# Patient Record
Sex: FEMALE | Race: WHITE | NOT HISPANIC OR LATINO | URBAN - METROPOLITAN AREA
[De-identification: names, ages, dates, MRNs, and addresses within clinical notes are randomized per-mention and may not be internally consistent; named-entity substitution may affect disease eponyms.]

---

## 2020-06-01 ENCOUNTER — LAB OUTSIDE AN ENCOUNTER (OUTPATIENT)
Dept: URBAN - METROPOLITAN AREA CLINIC 86 | Facility: CLINIC | Age: 60
End: 2020-06-01

## 2020-06-02 LAB
ALBUMIN: 4.6
ALKALINE PHOSPHATASE: 48
ALT (SGPT): 33
AST (SGOT): 28
BILIRUBIN, DIRECT: 0.09
BILIRUBIN, TOTAL: 0.2
PROTEIN, TOTAL: 7.1

## 2020-09-26 ENCOUNTER — TELEPHONE ENCOUNTER (OUTPATIENT)
Dept: URBAN - METROPOLITAN AREA CLINIC 92 | Facility: CLINIC | Age: 60
End: 2020-09-26

## 2020-09-26 NOTE — HPI-OTHER HISTORIES
The patient is a 58 year old /White female , who presents on referral from Moo Gonzalez MD , after a last visit March 2020 for a follow up telemed evaluation for ZAMORA. Hx a prior liver biopsy on 02/11/2013 showed 10% micro and macrovesicular steatosis, no portal inflammation,no granulamata or malignant infiltrates, no fibrosis.  She states no history of new medications or alcohol use. She denies any tobacco use. The patient reports no personal history of no other habits that could cause liver damage. Per the patient she had a colonoscopy in 2016.      Pt here for TELEMED vost follow up for fatty liver.    March 2020 labs: wbc 8.5 hg 11.6 plat 359 and glu 273 elevated and show local md.  MCH 26 (26.6-33) mchc 30 (31.5-35.7) and should mention to PRIMARY MD. cr 0.94 and bun 19, na 137 k 4.4 and alb 4.2 and tb  less than 0.2 and alk 49 and ast 24 and alt 46. inr 1.0 and hepatitis a immune.   Sugar was high and did in pm. She says she is working with local doctors to get this and has been an issue. A1c is running high and says 8.5 to 7.5 and so trending in the right way.   Prior she had allergic reaction and had steroids in the past and they felt was a triggering event and hard to catch up.  March 16 2020: local [place] scan: mod fatty infilatration of moderately enlarged liver. Nonobstructive 6.4mm  calculus left kidney. Stable appearing 13mm left renal cyst. Pancreas as visualized normal but portions obscured.Spleen normal. No gallstones. cbd normal at 4mm.  Liver 21cm and no liver lesions.   I think sugars issue is not helpign but trending the right way and we need to follow.  Re weight she says was 2 pounds lighter and says has been doing stress eating and working at home.  Sept 2019 u.s: local liver large 20.1cm. Prior 18-19 cm. No gallstones and normal gb wall thickness 2.4mm. cbd 4.7mm. Spleen 9.4cm. Left kidney 6mm stone and 15mm left kidney cyst.   Sept 2019 labs: glu 369 elevated and bun 26 cr 1.04 and na 135 k 5.0 and ast 16 and alt 21. tb 0.4 and alk 51.  inr 0.9 and wbc 8.4 hg 12.6 plat 348. Hep a immune.  Labs went up and we need to follow this.  Last year mentioned prior hemoglobin a1c was 7.7% so it had to get wore and should now getting better help the labs. So we headed right way again.  She has been working with the diabetes educator for the diet.   She seeing local doctor for her kidney issues and cyst.   Asked re the pituitary tumor and has been going to see a neurosx for this-Dr. Brandon Boswell.  Not felt to any impact and oct 2020 to do mri and holding for now.  2/26/19 wbc 9.4 hgb 12.6 plt elevated 386 gluc 167-elevated cr 1.06 alp 55 ast 19 alt 24 inr 1.0  3/19/19 u/s: liver enlarged 18.6cm, down from 19.1cm. consistent with fatty liver. hepatic lesions not seen. patent vessels. spleen measures 8.7cm. atrophic left kidney. nonobstructing left renal calculus  u/s 08/28/2018: liver normal in size and contour and mild diffuse echogenicity, no evidence of focal lesion or intrahepatic biliary dilatation, no gallstones, wall thickeming or pericholecystic fluid, cbd 4mm, spleen not enlarged, no hyrdonephrosis, no ascites  u/s 04/09/2018: liver:borderline hepatomegaly 18.2 cm , mildly hyperechoic parenchyma without focal mass, no obvious perihepatic fluid, normal gallbladder wtihout intraluminal calculi, cbd 3.8mm, spleen normal in size, kidneys normal in size  The key is the diet, the surgars, and tryign to stay on target for the metabolic syndrome.  Duration of visit mins with over 50% of the time explaining pt's condition and treatment plan with the pt and in reviewing the issues.

## 2020-09-28 ENCOUNTER — TELEPHONE ENCOUNTER (OUTPATIENT)
Dept: URBAN - METROPOLITAN AREA CLINIC 92 | Facility: CLINIC | Age: 60
End: 2020-09-28

## 2020-09-28 NOTE — HPI-TODAY'S VISIT:
Dear Mariia,  U.s sent in: mildly enlarged liver. Liver diffusely echogenic and fatty. Spleen normal 9.6cm. No kidney hydronephrosis. No gallstones and common duct 4.5mm. Liver mildly enlarged 22.3 cm.   Prior u.s stated liver moderately enlarged. They prior mentioned 6.4mm kidney stone left kidney but not mentioned now.  Liver 21 cm so measurement could  be a little off on ultrasound.  Please share with local providers.  Dr Stevens

## 2020-10-02 ENCOUNTER — OFFICE VISIT (OUTPATIENT)
Dept: URBAN - METROPOLITAN AREA TELEHEALTH 2 | Facility: TELEHEALTH | Age: 60
End: 2020-10-02
Payer: COMMERCIAL

## 2020-10-02 DIAGNOSIS — R16.0 HEPATOMEGALY: ICD-10-CM

## 2020-10-02 DIAGNOSIS — E66.9 OBESE BODY HABITUS: ICD-10-CM

## 2020-10-02 DIAGNOSIS — K75.81 NASH (NONALCOHOLIC STEATOHEPATITIS): ICD-10-CM

## 2020-10-02 DIAGNOSIS — N28.9 ABNORMAL RENAL FUNCTION: ICD-10-CM

## 2020-10-02 PROCEDURE — G8417 CALC BMI ABV UP PARAM F/U: HCPCS

## 2020-10-02 PROCEDURE — 99214 OFFICE O/P EST MOD 30 MIN: CPT

## 2020-10-02 PROCEDURE — 3017F COLORECTAL CA SCREEN DOC REV: CPT

## 2020-10-02 PROCEDURE — G9903 PT SCRN TBCO ID AS NON USER: HCPCS

## 2020-10-02 PROCEDURE — 1036F TOBACCO NON-USER: CPT

## 2020-10-02 PROCEDURE — G8427 DOCREV CUR MEDS BY ELIG CLIN: HCPCS

## 2020-10-02 RX ORDER — EVENING PRIMROSE OIL 500 MG
AS DIRECTED CAPSULE ORAL
Status: ACTIVE | COMMUNITY

## 2020-10-02 RX ORDER — INSULIN LISPRO 100 [IU]/ML
AS DIRECTED INJECTION, SOLUTION INTRAVENOUS; SUBCUTANEOUS
Status: DISCONTINUED | COMMUNITY

## 2020-10-02 RX ORDER — GLUCOSAMINE/D3/BOSWELLIA SERRA 1500MG-400
1 TABLET TABLET ORAL ONCE A DAY
Status: ACTIVE | COMMUNITY

## 2020-10-02 RX ORDER — ERENUMAB-AOOE 140 MG/ML
AS DIRECTED INJECTION, SOLUTION SUBCUTANEOUS
Status: ACTIVE | COMMUNITY

## 2020-10-02 RX ORDER — LEVOTHYROXINE SODIUM 0.14 MG/1
TAKE 1 TABLET (137 MCG) BY ORAL ROUTE ONCE DAILY TABLET ORAL 1
Qty: 0 | Refills: 0 | Status: ACTIVE | COMMUNITY
Start: 1900-01-01

## 2020-10-02 RX ORDER — METFORMIN HYDROCHLORIDE 1000 MG/1
TAKE 1 TABLET (1,000 MG) BY ORAL ROUTE 2 TIMES PER DAY WITH MORNING AND EVENING MEALS TABLET, COATED ORAL 2
Qty: 0 | Refills: 0 | Status: ACTIVE | COMMUNITY
Start: 1900-01-01

## 2020-10-02 NOTE — HPI-OTHER HISTORIES
The patient is a 60 year old /White female , who presents on referral from Moo Gonzalez MD , after a last visit March 2020 for a follow up telemed evaluation for ZAMORA.   Hx a prior liver biopsy on 02/11/2013 showed 10% micro and macrovesicular steatosis, no portal inflammation,no granulamata or malignant infiltrates, no fibrosis.   She states no history of new medications or alcohol use.   She denies any tobacco use. The patient reports no personal history of no other habits that could cause liver damage.   Per the patient she had a colonoscopy in 2016 and she says that is next is due in 10 yrs.   Pt here for TELEMED vost follow up for fatty liver.    Sept 2020:  U.s sent in: mildly enlarged liver. Liver diffusely echogenic and fatty. Spleen normal 9.6cm. No kidney hydronephrosis. No gallstones and common duct 4.5mm. Liver mildly enlarged 22.3 cm.   Prior u.s stated liver moderately enlarged. They prior mentioned 6.4mm kidney stone left kidney but not mentioned now.  Liver 21 cm so measurement could  be a little off on ultrasound.  she did labs 2 weeks ago [facility]. Not insystem and kathleen will check.  June 2020 ast 28 and alt 33 and alk 48 and tb 0.2. alb 4.1.   March 2020 labs: wbc 8.5 hg 11.6 plat 359 and glu 273 elevated and show local md.  MCH 26 (26.6-33) mchc 30 (31.5-35.7) and should mention to PRIMARY MD. cr 0.94 and bun 19, na 137 k 4.4 and alb 4.2 and tb  less than 0.2 and alk 49 and ast 24 and alt 46. inr 1.0 and hepatitis a immune.   She has not lost weight and she says a1c slightly lower 8.8 from 8.6 and adjusting pump and see if she can get better with new pump.   Prior she had allergic reaction and had steroids in the past and that was issue prior for her but not now and was 3-4 yrs.  March 16 2020: local [place] scan: mod fatty infilatration of moderately enlarged liver. Nonobstructive 6.4mm  calculus left kidney. Stable appearing 13mm left renal cyst. Pancreas as visualized normal but portions obscured.Spleen normal. No gallstones. cbd normal at 4mm.  Liver 21cm and no liver lesions.    She gained 3 pound with pandemic.  Sept 2019 u.s: local liver large 20.1cm. Prior 18-19 cm. No gallstones and normal gb wall thickness 2.4mm. cbd 4.7mm. Spleen 9.4cm. Left kidney 6mm stone and 15mm left kidney cyst.   Sept 2019 labs: glu 369 elevated and bun 26 cr 1.04 and na 135 k 5.0 and ast 16 and alt 21. tb 0.4 and alk 51.  inr 0.9 and wbc 8.4 hg 12.6 plat 348. Hep a immune.  She has been working with the diabetes educator for the diet.   She seeing local doctor for her kidney issues and cyst.   Asked re the pituitary tumor and has been going to see a neurosx for this-Dr. Brandon Boswell and is to follow up with him.    2/26/19 wbc 9.4 hgb 12.6 plt elevated 386 gluc 167-elevated cr 1.06 alp 55 ast 19 alt 24 inr 1.0  3/19/19 u/s: liver enlarged 18.6cm, down from 19.1cm. consistent with fatty liver. hepatic lesions not seen. patent vessels. spleen measures 8.7cm. atrophic left kidney. nonobstructing left renal calculus  u/s 08/28/2018: liver normal in size and contour and mild diffuse echogenicity, no evidence of focal lesion or intrahepatic biliary dilatation, no gallstones, wall thickeming or pericholecystic fluid, cbd 4mm, spleen not enlarged, no hyrdonephrosis, no ascites  u/s 04/09/2018: liver:borderline hepatomegaly 18.2 cm , mildly hyperechoic parenchyma without focal mass, no obvious perihepatic fluid, normal gallbladder wtihout intraluminal calculi, cbd 3.8mm, spleen normal in size, kidneys normal in size  Plan: 1. Work on diet and try to keep this as a loss. 2. She will work with local doctors on new pump. 3. Pt will all and see if we can find the missing labs. 4. Pt will redo labs and imaging in 6m and see us then. 5, Stressed need for the diet, the sugars, some weight loss and staying on target for the metabolic syndrome.  Stressed to pt the need for social distancing and strict handwashing and wearing a mask and to follow any other new or added CDC recommendations as this is an evolving target.  Duration of visit 27 mins with over 50% of the time explaining pt's condition and treatment plan with the pt and in reviewing the issues.

## 2020-10-03 ENCOUNTER — TELEPHONE ENCOUNTER (OUTPATIENT)
Dept: URBAN - METROPOLITAN AREA CLINIC 92 | Facility: CLINIC | Age: 60
End: 2020-10-03

## 2020-10-03 NOTE — HPI-TODAY'S VISIT:
Dear Mariia,  received the aug 3 local labs: po4 3.6 and Uric aid 4.2, ferritin 99, mg 1.8 and a1c 8.6 % elevated. iron sat 19%.  glucose 223 elevated and show local md, cr 0.97 and na 140 and k 5.1 and cl 102 ad co2 22 and ca 9.8 and alb 4,2 and tb 0.2 abd alk 49 and ast 22 and alt 25 and prior ast 28 and alt 33 so lower now. urine protein elevated 408 mg/24 hours ( normal so you have more protein in urine noted.)  Saw  also [facility] labs aug 3 glu 217 and cr 0.99 and na 138 and k 5.2 and cl 100 and co2 22 and ca 9.8 and alb 4.4 and tb 0.2 and alk 48 and ast 18 and alt 23, wbc 8.1 hg 12.5 plat 414.  These were ordered by two diff doctors.  ideal alt <25 and so on one sample right on that and on one little lower.  Defer to local doctors on other labs.  Dr Stevens

## 2021-03-23 ENCOUNTER — TELEPHONE ENCOUNTER (OUTPATIENT)
Dept: URBAN - METROPOLITAN AREA CLINIC 92 | Facility: CLINIC | Age: 61
End: 2021-03-23

## 2021-03-23 LAB
A/G RATIO: 1.9
ALBUMIN: 4.3
ALKALINE PHOSPHATASE: 52
ALT (SGPT): 21
AST (SGOT): 16
BASO (ABSOLUTE): 0
BASOS: 1
BILIRUBIN, TOTAL: 0.3
BUN/CREATININE RATIO: 20
BUN: 20
CALCIUM: 9.9
CARBON DIOXIDE, TOTAL: 23
CHLORIDE: 103
CREATININE: 0.99
EGFR IF AFRICN AM: 72
EGFR IF NONAFRICN AM: 63
EOS (ABSOLUTE): 0.3
EOS: 3
GLOBULIN, TOTAL: 2.3
GLUCOSE: 149
HEMATOCRIT: 40.4
HEMATOLOGY COMMENTS:: (no result)
HEMOGLOBIN: 12.6
IMMATURE CELLS: (no result)
IMMATURE GRANS (ABS): 0
IMMATURE GRANULOCYTES: 1
LYMPHS (ABSOLUTE): 2.4
LYMPHS: 32
MCH: 26.5
MCHC: 31.2
MCV: 85
MONOCYTES(ABSOLUTE): 0.5
MONOCYTES: 7
NEUTROPHILS (ABSOLUTE): 4.3
NEUTROPHILS: 56
NRBC: (no result)
PLATELETS: 370
POTASSIUM: 4.8
PROTEIN, TOTAL: 6.6
RBC: 4.76
RDW: 14
SODIUM: 140
WBC: 7.5

## 2021-03-23 NOTE — HPI-TODAY'S VISIT:
march 22 2021: wbc 7.5 and hg 12.6 and plat 370 and mcv 85. Neutrophils 4.3. tp 6.6 and alb 4.3 and tb 0.3 and alk 52 and ast 16 and alt 21 and ideal alt les than 25. Glu 149 elevated and bun 20 and cr 0.99.March 2020 ast and alt  24 and  26 so lower now.

## 2021-04-01 ENCOUNTER — LAB OUTSIDE AN ENCOUNTER (OUTPATIENT)
Dept: URBAN - METROPOLITAN AREA TELEHEALTH 2 | Facility: TELEHEALTH | Age: 61
End: 2021-04-01

## 2021-04-01 ENCOUNTER — OFFICE VISIT (OUTPATIENT)
Dept: URBAN - METROPOLITAN AREA CLINIC 86 | Facility: CLINIC | Age: 61
End: 2021-04-01

## 2021-04-01 NOTE — HPI-OTHER HISTORIES
The patient is a 60 year old /White female , who presents on referral from Moo Gonzalez MD , after a last visit Oct 2020 for a follow up telemed evaluation for ZAMORA.   Hx a prior liver biopsy on 02/11/2013 showed 10% micro and macrovesicular steatosis, no portal inflammation,no granulamata or malignant infiltrates, no fibrosis.   She states no history of new medications or alcohol use.   She denies any tobacco use. The patient reports no personal history of no other habits that could cause liver damage.   Per the patient she had a colonoscopy in 2016 and she says that is next is due in 10 yrs.   Pt here for TELEMED vost follow up for fatty liver.    march 22 2021: wbc 7.5 and hg 12.6 and plat 370 and mcv 85. Neutrophils 4.3. tp 6.6 and alb 4.3 and tb 0.3 and alk 52 and ast 16 and alt 21 and ideal alt les than 25. Glu 149 elevated and bun 20 and cr 0.99.March 2020 ast and alt  24 and  26 so lower now. Dear Mariia,  received the aug 3 local labs: po4 3.6 and Uric aid 4.2, ferritin 99, mg 1.8 and a1c 8.6 % elevated. iron sat 19%.  glucose 223 elevated and show local md, cr 0.97 and na 140 and k 5.1 and cl 102 ad co2 22 and ca 9.8 and alb 4,2 and tb 0.2 abd alk 49 and ast 22 and alt 25 and prior ast 28 and alt 33 so lower now. urine protein elevated 408 mg/24 hours ( normal so you have more protein in urine noted.)  Saw  also [facility] labs aug 3 glu 217 and cr 0.99 and na 138 and k 5.2 and cl 100 and co2 22 and ca 9.8 and alb 4.4 and tb 0.2 and alk 48 and ast 18 and alt 23, wbc 8.1 hg 12.5 plat 414.  These were ordered by two diff doctors.  ideal alt less than 25 and so on one sample right on that and on one little lower.  Defer to local doctors on other labs.  Dr Stevens   Sept 2020:  U.s sent in: mildly enlarged liver. Liver diffusely echogenic and fatty. Spleen normal 9.6cm. No kidney hydronephrosis. No gallstones and common duct 4.5mm. Liver mildly enlarged 22.3 cm.   Prior u.s stated liver moderately enlarged. They prior mentioned 6.4mm kidney stone left kidney but not mentioned now.  Liver 21 cm so measurement could  be a little off on ultrasound.  she did labs 2 weeks ago [facility]. Not insystem and kathleen will check.  June 2020 ast 28 and alt 33 and alk 48 and tb 0.2. alb 4.1.   March 2020 labs: wbc 8.5 hg 11.6 plat 359 and glu 273 elevated and show local md.  MCH 26 (26.6-33) mchc 30 (31.5-35.7) and should mention to PRIMARY MD. cr 0.94 and bun 19, na 137 k 4.4 and alb 4.2 and tb  less than 0.2 and alk 49 and ast 24 and alt 46. inr 1.0 and hepatitis a immune.   She has not lost weight and she says a1c slightly lower 8.8 from 8.6 and adjusting pump and see if she can get better with new pump.   Prior she had allergic reaction and had steroids in the past and that was issue prior for her but not now and was 3-4 yrs.  March 16 2020: local [place] scan: mod fatty infilatration of moderately enlarged liver. Nonobstructive 6.4mm  calculus left kidney. Stable appearing 13mm left renal cyst. Pancreas as visualized normal but portions obscured.Spleen normal. No gallstones. cbd normal at 4mm.  Liver 21cm and no liver lesions.    She gained 3 pound with pandemic.  Sept 2019 u.s: local liver large 20.1cm. Prior 18-19 cm. No gallstones and normal gb wall thickness 2.4mm. cbd 4.7mm. Spleen 9.4cm. Left kidney 6mm stone and 15mm left kidney cyst.   Sept 2019 labs: glu 369 elevated and bun 26 cr 1.04 and na 135 k 5.0 and ast 16 and alt 21. tb 0.4 and alk 51.  inr 0.9 and wbc 8.4 hg 12.6 plat 348. Hep a immune.  She has been working with the diabetes educator for the diet.   She seeing local doctor for her kidney issues and cyst.   Asked re the pituitary tumor and has been going to see a neurosx for this-Dr. Brandon Boswell and is to follow up with him.    2/26/19 wbc 9.4 hgb 12.6 plt elevated 386 gluc 167-elevated cr 1.06 alp 55 ast 19 alt 24 inr 1.0  3/19/19 u/s: liver enlarged 18.6cm, down from 19.1cm. consistent with fatty liver. hepatic lesions not seen. patent vessels. spleen measures 8.7cm. atrophic left kidney. nonobstructing left renal calculus  u/s 08/28/2018: liver normal in size and contour and mild diffuse echogenicity, no evidence of focal lesion or intrahepatic biliary dilatation, no gallstones, wall thickeming or pericholecystic fluid, cbd 4mm, spleen not enlarged, no hyrdonephrosis, no ascites  u/s 04/09/2018: liver:borderline hepatomegaly 18.2 cm , mildly hyperechoic parenchyma without focal mass, no obvious perihepatic fluid, normal gallbladder wtihout intraluminal calculi, cbd 3.8mm, spleen normal in size, kidneys normal in size  Plan: 1. Work on diet and try to keep this as a loss. 2. She will work with local doctors on new pump. 3. Pt will all and see if we can find the missing labs. 4. Pt will redo labs and imaging in 6m and see us then. 5, Stressed need for the diet, the sugars, some weight loss and staying on target for the metabolic syndrome.  Stressed to pt the need for social distancing and strict handwashing and wearing a mask and to follow any other new or added CDC recommendations as this is an evolving target.  Duration of visit 27 mins with over 50% of the time explaining pt's condition and treatment plan with the pt and in reviewing the issues.

## 2021-05-19 ENCOUNTER — OFFICE VISIT (OUTPATIENT)
Dept: URBAN - METROPOLITAN AREA TELEHEALTH 2 | Facility: TELEHEALTH | Age: 61
End: 2021-05-19
Payer: COMMERCIAL

## 2021-05-19 DIAGNOSIS — R16.0 HEPATOMEGALY: ICD-10-CM

## 2021-05-19 DIAGNOSIS — D35.2 PITUITARY ADENOMA: ICD-10-CM

## 2021-05-19 DIAGNOSIS — R79.89 ELEVATED PLATELET COUNT: ICD-10-CM

## 2021-05-19 DIAGNOSIS — K75.81 NASH (NONALCOHOLIC STEATOHEPATITIS): ICD-10-CM

## 2021-05-19 PROCEDURE — 99214 OFFICE O/P EST MOD 30 MIN: CPT

## 2021-05-19 RX ORDER — NARATRIPTAN 2.5 MG/1
1 TABLET TABLET ORAL ONCE A DAY
Status: ACTIVE | COMMUNITY

## 2021-05-19 RX ORDER — LEVOTHYROXINE SODIUM 0.14 MG/1
TAKE 1 TABLET (137 MCG) BY ORAL ROUTE ONCE DAILY TABLET ORAL 1
Qty: 0 | Refills: 0 | Status: ACTIVE | COMMUNITY
Start: 1900-01-01

## 2021-05-19 RX ORDER — METFORMIN HYDROCHLORIDE 1000 MG/1
TAKE 1 TABLET (1,000 MG) BY ORAL ROUTE 2 TIMES PER DAY WITH MORNING AND EVENING MEALS TABLET, COATED ORAL 2
Qty: 0 | Refills: 0 | Status: ACTIVE | COMMUNITY
Start: 1900-01-01

## 2021-05-19 RX ORDER — ERENUMAB-AOOE 140 MG/ML
AS DIRECTED INJECTION, SOLUTION SUBCUTANEOUS
Status: ACTIVE | COMMUNITY

## 2021-05-19 RX ORDER — GLUCOSAMINE/D3/BOSWELLIA SERRA 1500MG-400
1 TABLET TABLET ORAL ONCE A DAY
Status: ACTIVE | COMMUNITY

## 2021-05-19 RX ORDER — TIZANIDINE 4 MG/1
1 TABLET AS NEEDED TABLET ORAL QHS
Status: ACTIVE | COMMUNITY

## 2021-05-19 RX ORDER — EVENING PRIMROSE OIL 500 MG
AS DIRECTED CAPSULE ORAL
Status: ACTIVE | COMMUNITY

## 2021-05-19 NOTE — HPI-OTHER HISTORIES
The patient is a 60 year old /White female , who presents on referral from Moo Gonzalez MD , after a last visit Oct 2020 for a follow up telemed evaluation for ZAMORA.   She has done the covid 19 vaccine and did that 2nd in april 2021.  recap:  Hx a prior liver biopsy on 02/11/2013 showed 10% micro and macrovesicular steatosis, no portal inflammation,no granulamata or malignant infiltrates, no fibrosis.   She states no history of  alcohol use.   The patient reports no personal history of no other habits that could cause liver damage.   Per the patient she had a colonoscopy in 2016 and she says that is next is due in 10 yrs.   inetrval notes:  March 22 2021: wbc 7.5 and hg 12.6 and plat 370 and mcv 85. Neutrophils 4.3. tp 6.6 and alb 4.3 and tb 0.3 and alk 52 and ast 16 and alt 21 and ideal alt les than 25. Glu 149 elevated and bun 20 and cr 0.99. March 2020 ast and alt  24 and  26 so lower now.  She says she has been about the same.   She says not exercising more and has fit bit and trying to get 5000 steps a day.  Aug 3 2020  local labs: po4 3.6 and Uric aid 4.2, ferritin 99, mg 1.8 and a1c 8.6 % elevated. iron sat 19%.  glucose 223 elevated and show local md, cr 0.97 and na 140 and k 5.1 and cl 102 ad co2 22 and ca 9.8 and alb 4.2 and tb 0.2 abd alk 49 and ast 22 and alt 25 and prior ast 28 and alt 33 so lower now. urine protein elevated 408 mg/24 hours ( normal so you have more protein in urine noted.)  Saw  also second labs aug 3 glu 217 and cr 0.99 and na 138 and k 5.2 and cl 100 and co2 22 and ca 9.8 and alb 4.4 and tb 0.2 and alk 48 and ast 18 and alt 23, wbc 8.1 hg 12.5 plat 414.  These were ordered by two diff doctors.  Asked re the urine protein was better that she knows of.  She did not do the u.s and does kidney one twice a year and they usually do the kidney and the liver.  Sept 2020:  U.s sent in: mildly enlarged liver. Liver diffusely echogenic and fatty. Spleen normal 9.6cm. No kidney hydronephrosis. No gallstones and common duct 4.5mm. Liver mildly enlarged 22.3 cm.   Prior u.s stated liver moderately enlarged. They prior mentioned 6.4mm kidney stone left kidney but not mentioned now.  Liver 21 cm so measurement could  be a little off on ultrasound.  June 2020 ast 28 and alt 33 and alk 48 and tb 0.2. alb 4.1.  March 2020 labs: wbc 8.5 hg 11.6 plat 359 and glu 273 elevated and show local md.  MCH 26 (26.6-33) mchc 30 (31.5-35.7) and should mention to PRIMARY MD. cr 0.94 and bun 19, na 137 k 4.4 and alb 4.2 and tb  less than 0.2 and alk 49 and ast 24 and alt 46. inr 1.0 and hepatitis a immune.   She says the pump has been helping her and she says blood sugar has been variable and less so in last month.  She does the dexcom and says it also has variable.  March 16 2020: local [place] scan: mod fatty infilatration of moderately enlarged liver. Nonobstructive 6.4mm  calculus left kidney. Stable appearing 13mm left renal cyst. Pancreas as visualized normal but portions obscured.Spleen normal. No gallstones. cbd normal at 4mm.  Liver 21cm and no liver lesions.   Sept 2019 u.s: local liver large 20.1cm. Prior 18-19 cm. No gallstones and normal gb wall thickness 2.4mm. cbd 4.7mm. Spleen 9.4cm. Left kidney 6mm stone and 15mm left kidney cyst.   Sept 2019 labs: glu 369 elevated and bun 26 cr 1.04 and na 135 k 5.0 and ast 16 and alt 21. tb 0.4 and alk 51.  inr 0.9 and wbc 8.4 hg 12.6 plat 348. Hep a immune.  She seeing local doctor for her kidney issues and cyst.   Asked re the pituitary tumor and has been going to see a neurosx for this-Dr. Brandon Boswell he left [place] and she is needing to follow one.     2/26/19 wbc 9.4 hgb 12.6 plt elevated 386 gluc 167-elevated cr 1.06 alp 55 ast 19 alt 24 inr 1.0  3/19/19 u/s: liver enlarged 18.6cm, down from 19.1cm. consistent with fatty liver. hepatic lesions not seen. patent vessels. spleen measures 8.7cm. atrophic left kidney. nonobstructing left renal calculus  u/s 08/28/2018: liver normal in size and contour and mild diffuse echogenicity, no evidence of focal lesion or intrahepatic biliary dilatation, no gallstones, wall thickeming or pericholecystic fluid, cbd 4mm, spleen not enlarged, no hyrdonephrosis, no ascites  u/s 04/09/2018: liver:borderline hepatomegaly 18.2 cm , mildly hyperechoic parenchyma without focal mass, no obvious perihepatic fluid, normal gallbladder wtihout intraluminal calculi, cbd 3.8mm, spleen normal in size, kidneys normal in size  Plan: 1. She will continue to work on diet and try to keep this controlled. 2. She will work with local doctors on her the pump issues. 3. Pt did u.s and kathleen will all to get it. 4. t will redo labs and imaging in 6m and see us then. 5. Stressed need for the diet, the sugars, some weight loss and staying on target for the metabolic syndrome.  Stressed to pt the need for social distancing and strict handwashing and wearing a mask and to follow any other new or added CDC recommendations as this is an evolving target.  Duration of visit 30 mins via healow with over 50% of the time explaining pt's condition and treatment plan with the pt and in reviewing the issues.

## 2021-09-28 ENCOUNTER — TELEPHONE ENCOUNTER (OUTPATIENT)
Dept: URBAN - METROPOLITAN AREA CLINIC 92 | Facility: CLINIC | Age: 61
End: 2021-09-28

## 2021-09-28 LAB
A/G RATIO: 1.6
ALBUMIN: 4.3
ALKALINE PHOSPHATASE: 49
ALT (SGPT): 33
AST (SGOT): 23
BASO (ABSOLUTE): 0
BASOS: 1
BILIRUBIN, TOTAL: 0.3
BUN/CREATININE RATIO: 21
BUN: 20
CALCIUM: 9.7
CARBON DIOXIDE, TOTAL: 25
CHLORIDE: 100
CREATININE: 0.94
EGFR IF AFRICN AM: 76
EGFR IF NONAFRICN AM: 66
EOS (ABSOLUTE): 0.4
EOS: 5
GLOBULIN, TOTAL: 2.7
GLUCOSE: 179
HEMATOCRIT: 41.2
HEMATOLOGY COMMENTS:: (no result)
HEMOGLOBIN: 12.4
IMMATURE CELLS: (no result)
IMMATURE GRANS (ABS): 0.1
IMMATURE GRANULOCYTES: 1
LYMPHS (ABSOLUTE): 2.4
LYMPHS: 29
MCH: 25.5
MCHC: 30.1
MCV: 85
MONOCYTES(ABSOLUTE): 0.6
MONOCYTES: 8
NEUTROPHILS (ABSOLUTE): 4.7
NEUTROPHILS: 56
NRBC: (no result)
PLATELETS: 419
POTASSIUM: 4.9
PROTEIN, TOTAL: 7
RBC: 4.86
RDW: 13.3
SODIUM: 138
WBC: 8.1

## 2021-09-28 NOTE — HPI-TODAY'S VISIT:
Dear Mariia Henson, September 27 labs show white blood cell count 8.1 hemoglobin 12.4 platelet count 419.  Previously hemoglobin 12.6 and platelet count 370.  Your MCH remains low at 25.5 and MCHC remains low at 30.1.  Please share with primary provider.  Neutrophils 4.7 and lymphocytes 2.4 both normal. Sugar elevated at 179 previously was 149.  Please share with primary provider as well. BUN of 20 creatinine 0.94 sodium 138 potassium 4.9 albumin 4.3 bilirubin 0.3 alk phosphatase 49 AST 23 and the ALT 33.  Previously AST 16 and ALT 21 so slightly up on the AST and ALT. Liver labs little up. Did anything change since last visit? Let us know. Please call [phone] ext [phone]. Dr Stevens

## 2021-10-15 ENCOUNTER — TELEPHONE ENCOUNTER (OUTPATIENT)
Dept: URBAN - METROPOLITAN AREA CLINIC 92 | Facility: CLINIC | Age: 61
End: 2021-10-15

## 2021-10-15 NOTE — HPI-TODAY'S VISIT:
Dear Mariia Henson, 10-9 [facility] sent in to us: Visualized portions of the IVC and abdominal aorta were clear to them. The pancreatic head and body were somewhat heterogeneous but without focal solid or cystic mass.  Distal pancreatic body and tail were obscured by gas. The liver was enlarged measuring 21.6 cm and appeared diffusely echogenic but without focal mass. Gallbladder was normal without stones.  Common bile duct was normal at 4 mm.  Portal vein had normal directional flow seen. Spleen was normal at 9.2 cm. Right kidney was 9.8 x 5.1 x 5.0 cm with no focal mass.  Left kidney 9.1 x 5.2 x 5.37cm with no focal mass. They mention that you have persistent hepatomegaly with diffuse steatosis or fat of the liver.  They mention again that your pancreas was mildly heterogeneous and that portions were not seen due to gas. As you recall your prior ultrasound had said the liver was mildly enlarged as well and also diffusely echogenic.  They said it was 22.3 cm last time and so it does appear to be possibly slightly smaller at this time but that can vary depending on how they measure it. We need to keep working on those issues that we talked about at the visit.  Hopefully with continued work this will improve as well. Dr. Stevens

## 2021-10-25 ENCOUNTER — LAB OUTSIDE AN ENCOUNTER (OUTPATIENT)
Dept: URBAN - METROPOLITAN AREA TELEHEALTH 2 | Facility: TELEHEALTH | Age: 61
End: 2021-10-25

## 2021-11-17 ENCOUNTER — OFFICE VISIT (OUTPATIENT)
Dept: URBAN - METROPOLITAN AREA TELEHEALTH 2 | Facility: TELEHEALTH | Age: 61
End: 2021-11-17

## 2021-11-17 NOTE — HPI-OTHER HISTORIES
The patient is a 61 year old /White female , who presents on referral from Moo Gonzalez MD , after a last visit May 2021  for a follow up telemed evaluation for ZAMORA.   She has done the covid 19 vaccine and did that 2nd in april 2021.  Dear Mariia Henson, 10-9 local [facility] sent in to us: Visualized portions of the IVC and abdominal aorta were clear to them. The pancreatic head and body were somewhat heterogeneous but without focal solid or cystic mass.  Distal pancreatic body and tail were obscured by gas. The liver was enlarged measuring 21.6 cm and appeared diffusely echogenic but without focal mass. Gallbladder was normal without stones.  Common bile duct was normal at 4 mm.  Portal vein had normal directional flow seen. Spleen was normal at 9.2 cm. Right kidney was 9.8 x 5.1 x 5.0 cm with no focal mass.  Left kidney 9.1 x 5.2 x 5.37cm with no focal mass. They mention that you have persistent hepatomegaly with diffuse steatosis or fat of the liver.  They mention again that your pancreas was mildly heterogeneous and that portions were not seen due to gas. As you recall your prior ultrasound had said the liver was mildly enlarged as well and also diffusely echogenic.  They said it was 22.3 cm last time and so it does appear to be possibly slightly smaller at this time but that can vary depending on how they measure it. We need to keep working on those issues that we talked about at the visit.  Hopefully with continued work this will improve as well. Dr. Stevens Dear Mariia Henson, September 27 labs show white blood cell count 8.1 hemoglobin 12.4 platelet count 419.  Previously hemoglobin 12.6 and platelet count 370.  Your MCH remains low at 25.5 and MCHC remains low at 30.1.  Please share with primary provider.  Neutrophils 4.7 and lymphocytes 2.4 both normal. Sugar elevated at 179 previously was 149.  Please share with primary provider as well. BUN of 20 creatinine 0.94 sodium 138 potassium 4.9 albumin 4.3 bilirubin 0.3 alk phosphatase 49 AST 23 and the ALT 33.  Previously AST 16 and ALT 21 so slightly up on the AST and ALT. Liver labs little up. Did anything change since last visit? Let us know. Please call [phone] ext [phone]. Dr Stevens   recap:  Hx a prior liver biopsy on 02/11/2013 showed 10% micro and macrovesicular steatosis, no portal inflammation,no granulamata or malignant infiltrates, no fibrosis.   She states no history of  alcohol use.   The patient reports no personal history of no other habits that could cause liver damage.   Per the patient she had a colonoscopy in 2016 and she says that is next is due in 10 yrs.   inetrval notes:  March 22 2021: wbc 7.5 and hg 12.6 and plat 370 and mcv 85. Neutrophils 4.3. tp 6.6 and alb 4.3 and tb 0.3 and alk 52 and ast 16 and alt 21 and ideal alt les than 25. Glu 149 elevated and bun 20 and cr 0.99. March 2020 ast and alt  24 and  26 so lower now.  She says she has been about the same.   She says not exercising more and has fit bit and trying to get 5000 steps a day.  Aug 3 2020  local labs: po4 3.6 and Uric aid 4.2, ferritin 99, mg 1.8 and a1c 8.6 % elevated. iron sat 19%.  glucose 223 elevated and show local md, cr 0.97 and na 140 and k 5.1 and cl 102 ad co2 22 and ca 9.8 and alb 4.2 and tb 0.2 abd alk 49 and ast 22 and alt 25 and prior ast 28 and alt 33 so lower now. urine protein elevated 408 mg/24 hours ( normal so you have more protein in urine noted.)  Saw  also second labs aug 3 glu 217 and cr 0.99 and na 138 and k 5.2 and cl 100 and co2 22 and ca 9.8 and alb 4.4 and tb 0.2 and alk 48 and ast 18 and alt 23, wbc 8.1 hg 12.5 plat 414.  These were ordered by two diff doctors.  Asked re the urine protein was better that she knows of.  She did not do the u.s and does kidney one twice a year and they usually do the kidney and the liver.  Sept 2020:  U.s sent in: mildly enlarged liver. Liver diffusely echogenic and fatty. Spleen normal 9.6cm. No kidney hydronephrosis. No gallstones and common duct 4.5mm. Liver mildly enlarged 22.3 cm.   Prior u.s stated liver moderately enlarged. They prior mentioned 6.4mm kidney stone left kidney but not mentioned now.  Liver 21 cm so measurement could  be a little off on ultrasound.  June 2020 ast 28 and alt 33 and alk 48 and tb 0.2. alb 4.1.  March 2020 labs: wbc 8.5 hg 11.6 plat 359 and glu 273 elevated and show local md.  MCH 26 (26.6-33) mchc 30 (31.5-35.7) and should mention to PRIMARY MD. cr 0.94 and bun 19, na 137 k 4.4 and alb 4.2 and tb  less than 0.2 and alk 49 and ast 24 and alt 46. inr 1.0 and hepatitis a immune.   She says the pump has been helping her and she says blood sugar has been variable and less so in last month.  She does the dexcom and says it also has variable.  March 16 2020: local [place] scan: mod fatty infilatration of moderately enlarged liver. Nonobstructive 6.4mm  calculus left kidney. Stable appearing 13mm left renal cyst. Pancreas as visualized normal but portions obscured.Spleen normal. No gallstones. cbd normal at 4mm.  Liver 21cm and no liver lesions.   Sept 2019 u.s: local liver large 20.1cm. Prior 18-19 cm. No gallstones and normal gb wall thickness 2.4mm. cbd 4.7mm. Spleen 9.4cm. Left kidney 6mm stone and 15mm left kidney cyst.   Sept 2019 labs: glu 369 elevated and bun 26 cr 1.04 and na 135 k 5.0 and ast 16 and alt 21. tb 0.4 and alk 51.  inr 0.9 and wbc 8.4 hg 12.6 plat 348. Hep a immune.  She seeing local doctor for her kidney issues and cyst.   Asked re the pituitary tumor and has been going to see a neurosx for this-Dr. Brandon Boswell he left [place] and she is needing to follow one.     2/26/19 wbc 9.4 hgb 12.6 plt elevated 386 gluc 167-elevated cr 1.06 alp 55 ast 19 alt 24 inr 1.0  3/19/19 u/s: liver enlarged 18.6cm, down from 19.1cm. consistent with fatty liver. hepatic lesions not seen. patent vessels. spleen measures 8.7cm. atrophic left kidney. nonobstructing left renal calculus  u/s 08/28/2018: liver normal in size and contour and mild diffuse echogenicity, no evidence of focal lesion or intrahepatic biliary dilatation, no gallstones, wall thickeming or pericholecystic fluid, cbd 4mm, spleen not enlarged, no hyrdonephrosis, no ascites  u/s 04/09/2018: liver:borderline hepatomegaly 18.2 cm , mildly hyperechoic parenchyma without focal mass, no obvious perihepatic fluid, normal gallbladder wtihout intraluminal calculi, cbd 3.8mm, spleen normal in size, kidneys normal in size  Plan: 1. She will continue to work on diet and try to keep this controlled. 2. She will work with local doctors on her the pump issues. 3. Pt did u.s and kathleen will all to get it. 4. t will redo labs and imaging in 6m and see us then. 5. Stressed need for the diet, the sugars, some weight loss and staying on target for the metabolic syndrome.  Stressed to pt the need for social distancing and strict handwashing and wearing a mask and to follow any other new or added CDC recommendations as this is an evolving target.  Duration of visit  mins via healow with over 50% of the time explaining pt's condition and treatment plan with the pt and in reviewing the issues.

## 2021-11-19 ENCOUNTER — OFFICE VISIT (OUTPATIENT)
Dept: URBAN - METROPOLITAN AREA TELEHEALTH 2 | Facility: TELEHEALTH | Age: 61
End: 2021-11-19

## 2021-12-02 ENCOUNTER — OFFICE VISIT (OUTPATIENT)
Dept: URBAN - METROPOLITAN AREA TELEHEALTH 2 | Facility: TELEHEALTH | Age: 61
End: 2021-12-02
Payer: COMMERCIAL

## 2021-12-02 VITALS — BODY MASS INDEX: 35.26 KG/M2 | WEIGHT: 199 LBS | HEIGHT: 63 IN

## 2021-12-02 DIAGNOSIS — R16.0 HEPATOMEGALY: ICD-10-CM

## 2021-12-02 DIAGNOSIS — K75.81 NASH (NONALCOHOLIC STEATOHEPATITIS): ICD-10-CM

## 2021-12-02 DIAGNOSIS — N28.9 ABNORMAL RENAL FUNCTION: ICD-10-CM

## 2021-12-02 DIAGNOSIS — E66.9 OBESE BODY HABITUS: ICD-10-CM

## 2021-12-02 PROCEDURE — 99214 OFFICE O/P EST MOD 30 MIN: CPT

## 2021-12-02 RX ORDER — METFORMIN HYDROCHLORIDE 1000 MG/1
TAKE 1 TABLET (1,000 MG) BY ORAL ROUTE 2 TIMES PER DAY WITH MORNING AND EVENING MEALS TABLET, COATED ORAL 2
Qty: 0 | Refills: 0 | Status: ACTIVE | COMMUNITY
Start: 1900-01-01

## 2021-12-02 RX ORDER — EVENING PRIMROSE OIL 500 MG
AS DIRECTED CAPSULE ORAL
Status: ACTIVE | COMMUNITY

## 2021-12-02 RX ORDER — ERENUMAB-AOOE 140 MG/ML
AS DIRECTED INJECTION, SOLUTION SUBCUTANEOUS
Status: ON HOLD | COMMUNITY

## 2021-12-02 RX ORDER — HYDROCODONE BITARTRATE AND ACETAMINOPHEN 5; 325 MG/1; MG/1
1 TABLET AS NEEDED TABLET ORAL
Status: ACTIVE | COMMUNITY

## 2021-12-02 RX ORDER — TIZANIDINE 4 MG/1
1 TABLET AS NEEDED TABLET ORAL QHS
Status: ON HOLD | COMMUNITY

## 2021-12-02 RX ORDER — NARATRIPTAN 2.5 MG/1
1 TABLET TABLET ORAL ONCE A DAY
Status: ACTIVE | COMMUNITY

## 2021-12-02 RX ORDER — LEVOTHYROXINE SODIUM 0.14 MG/1
TAKE 1 TABLET (137 MCG) BY ORAL ROUTE ONCE DAILY TABLET ORAL 1
Qty: 0 | Refills: 0 | Status: ACTIVE | COMMUNITY
Start: 1900-01-01

## 2021-12-02 RX ORDER — GLUCOSAMINE/D3/BOSWELLIA SERRA 1500MG-400
1 TABLET TABLET ORAL ONCE A DAY
Status: ACTIVE | COMMUNITY

## 2021-12-02 NOTE — HPI-OTHER HISTORIES
The patient is a 61 year old /White female , who presents on referral from Moo Gonzalez MD , after a last visit May 2021  for a follow up telemed evaluation for ZAMORA.          did telehealth visit then switched to phone call since she couldn't hear me. pt had cough x 1 month.  she is on otc for this. was on z-gualberto. had elevated hemoglobin a1c around 8%-having pump adjusted. states she had issues with this and suspect bump in lfts from this.   oct 2021: cbc ok, glucose elevated 151 follow up with primary MD. ast 30, alt 35 elevated. t4 1.93 elevated tsh low 0.188  10-9 local u.s sent in to us: Visualized portions of the IVC and abdominal aorta were clear to them. The pancreatic head and body were somewhat heterogeneous but without focal solid or cystic mass.  Distal pancreatic body and tail were obscured by gas. The liver was enlarged measuring 21.6 cm and appeared diffusely echogenic but without focal mass. Gallbladder was normal without stones.  Common bile duct was normal at 4 mm.  Portal vein had normal directional flow seen. Spleen was normal at 9.2 cm. Right kidney was 9.8 x 5.1 x 5.0 cm with no focal mass.  Left kidney 9.1 x 5.2 x 5.37cm with no focal mass. They mention that you have persistent hepatomegaly with diffuse steatosis or fat of the liver.  They mention again that your pancreas was mildly heterogeneous and that portions were not seen due to gas. As you recall your prior ultrasound had said the liver was mildly enlarged as well and also diffusely echogenic.  They said it was 22.3 cm last time and so it does appear to be possibly slightly smaller at this time but that can vary depending on how they measure it. We need to keep working on those issues that we talked about at the visit.  Hopefully with continued work this will improve as well.  September 27 labs show white blood cell count 8.1 hemoglobin 12.4 platelet count 419.  Previously hemoglobin 12.6 and platelet count 370.  Your MCH remains low at 25.5 and MCHC remains low at 30.1.  Please share with primary provider.  Neutrophils 4.7 and lymphocytes 2.4 both normal. Sugar elevated at 179 previously was 149.  Please share with primary provider as well. BUN of 20 creatinine 0.94 sodium 138 potassium 4.9 albumin 4.3 bilirubin 0.3 alk phosphatase 49 AST 23 and the ALT 33.  Previously AST 16 and ALT 21 so slightly up on the AST and ALT.   recap:  Hx a prior liver biopsy on 02/11/2013 showed 10% micro and macrovesicular steatosis, no portal inflammation,no granulamata or malignant infiltrates, no fibrosis.   She states no history of  alcohol use.   The patient reports no personal history of no other habits that could cause liver damage.   Per the patient she had a colonoscopy in 2016 and she says that is next is due in 10 yrs.   inetrval notes:  March 22 2021: wbc 7.5 and hg 12.6 and plat 370 and mcv 85. Neutrophils 4.3. tp 6.6 and alb 4.3 and tb 0.3 and alk 52 and ast 16 and alt 21 and ideal alt les than 25. Glu 149 elevated and bun 20 and cr 0.99. March 2020 ast and alt  24 and  26 so lower now.  She says she has been about the same.   She says not exercising more and has fit bit and trying to get 5000 steps a day.  Aug 3 2020  local labs: po4 3.6 and Uric aid 4.2, ferritin 99, mg 1.8 and a1c 8.6 % elevated. iron sat 19%.  glucose 223 elevated and show local md, cr 0.97 and na 140 and k 5.1 and cl 102 ad co2 22 and ca 9.8 and alb 4.2 and tb 0.2 abd alk 49 and ast 22 and alt 25 and prior ast 28 and alt 33 so lower now. urine protein elevated 408 mg/24 hours ( normal so you have more protein in urine noted.)  Saw  also second labs aug 3 glu 217 and cr 0.99 and na 138 and k 5.2 and cl 100 and co2 22 and ca 9.8 and alb 4.4 and tb 0.2 and alk 48 and ast 18 and alt 23, wbc 8.1 hg 12.5 plat 414.  These were ordered by two diff doctors.  Asked re the urine protein was better that she knows of.  She did not do the u.s and does kidney one twice a year and they usually do the kidney and the liver.  Sept 2020:  U.s sent in: mildly enlarged liver. Liver diffusely echogenic and fatty. Spleen normal 9.6cm. No kidney hydronephrosis. No gallstones and common duct 4.5mm. Liver mildly enlarged 22.3 cm.   Prior u.s stated liver moderately enlarged. They prior mentioned 6.4mm kidney stone left kidney but not mentioned now.  Liver 21 cm so measurement could  be a little off on ultrasound.  June 2020 ast 28 and alt 33 and alk 48 and tb 0.2. alb 4.1.  March 2020 labs: wbc 8.5 hg 11.6 plat 359 and glu 273 elevated and show local md.  MCH 26 (26.6-33) mchc 30 (31.5-35.7) and should mention to PRIMARY MD. cr 0.94 and bun 19, na 137 k 4.4 and alb 4.2 and tb  less than 0.2 and alk 49 and ast 24 and alt 46. inr 1.0 and hepatitis a immune.

## 2021-12-02 NOTE — PHYSICAL EXAM HENT:
Head , normocephalic , atraumatic , Face , Face within normal limits , Ears , External ears within normal limits , Nose/Nasopharynx , External nose  normal appearance , Mouth and Throat , Oral cavity appearance normal 25-Feb-2018 21:21

## 2022-01-11 ENCOUNTER — TELEPHONE ENCOUNTER (OUTPATIENT)
Dept: URBAN - METROPOLITAN AREA CLINIC 92 | Facility: CLINIC | Age: 62
End: 2022-01-11

## 2022-01-11 LAB
A/G RATIO: 1.7
ALBUMIN: 4.7
ALKALINE PHOSPHATASE: 60
ALT (SGPT): 37
AST (SGOT): 29
BASO (ABSOLUTE): 0.1
BASOS: 1
BILIRUBIN, DIRECT: 0.12
BILIRUBIN, TOTAL: 0.3
BUN/CREATININE RATIO: 26
BUN: 25
CALCIUM: 10.4
CARBON DIOXIDE, TOTAL: 25
CHLORIDE: 100
CREATININE: 0.97
EGFR IF AFRICN AM: 73
EGFR IF NONAFRICN AM: 64
EOS (ABSOLUTE): 0.2
EOS: 3
GLOBULIN, TOTAL: 2.7
GLUCOSE: 202
HEMATOCRIT: 44.2
HEMATOLOGY COMMENTS:: (no result)
HEMOGLOBIN: 13.3
IMMATURE CELLS: (no result)
IMMATURE GRANS (ABS): 0.1
IMMATURE GRANULOCYTES: 1
LYMPHS (ABSOLUTE): 2.7
LYMPHS: 29
MCH: 25.6
MCHC: 30.1
MCV: 85
MONOCYTES(ABSOLUTE): 0.6
MONOCYTES: 6
NEUTROPHILS (ABSOLUTE): 5.5
NEUTROPHILS: 60
NRBC: (no result)
PLATELETS: 451
POTASSIUM: 5.1
PROTEIN, TOTAL: 7.4
RBC: 5.2
RDW: 14.4
SODIUM: 139
WBC: 9

## 2022-03-02 ENCOUNTER — LAB OUTSIDE AN ENCOUNTER (OUTPATIENT)
Dept: URBAN - METROPOLITAN AREA TELEHEALTH 2 | Facility: TELEHEALTH | Age: 62
End: 2022-03-02

## 2022-03-31 ENCOUNTER — WEB ENCOUNTER (OUTPATIENT)
Dept: URBAN - METROPOLITAN AREA TELEHEALTH 2 | Facility: TELEHEALTH | Age: 62
End: 2022-03-31

## 2022-04-02 ENCOUNTER — LAB OUTSIDE AN ENCOUNTER (OUTPATIENT)
Dept: URBAN - METROPOLITAN AREA TELEHEALTH 2 | Facility: TELEHEALTH | Age: 62
End: 2022-04-02

## 2022-04-04 ENCOUNTER — OFFICE VISIT (OUTPATIENT)
Dept: URBAN - METROPOLITAN AREA TELEHEALTH 2 | Facility: TELEHEALTH | Age: 62
End: 2022-04-04
Payer: COMMERCIAL

## 2022-04-04 ENCOUNTER — TELEPHONE ENCOUNTER (OUTPATIENT)
Dept: URBAN - METROPOLITAN AREA CLINIC 92 | Facility: CLINIC | Age: 62
End: 2022-04-04

## 2022-04-04 DIAGNOSIS — K75.81 NASH (NONALCOHOLIC STEATOHEPATITIS): ICD-10-CM

## 2022-04-04 DIAGNOSIS — R74.8 ABNORMAL LIVER ENZYMES: ICD-10-CM

## 2022-04-04 DIAGNOSIS — R16.0 HEPATOMEGALY: ICD-10-CM

## 2022-04-04 DIAGNOSIS — E66.9 OBESE BODY HABITUS: ICD-10-CM

## 2022-04-04 PROCEDURE — 99214 OFFICE O/P EST MOD 30 MIN: CPT

## 2022-04-04 RX ORDER — EVENING PRIMROSE OIL 500 MG
AS DIRECTED CAPSULE ORAL
Status: ACTIVE | COMMUNITY

## 2022-04-04 RX ORDER — LEVOTHYROXINE SODIUM 0.14 MG/1
TAKE 1 TABLET (137 MCG) BY ORAL ROUTE ONCE DAILY TABLET ORAL 1
Qty: 0 | Refills: 0 | Status: ACTIVE | COMMUNITY
Start: 1900-01-01

## 2022-04-04 RX ORDER — METFORMIN HYDROCHLORIDE 1000 MG/1
TAKE 1 TABLET (1,000 MG) BY ORAL ROUTE 2 TIMES PER DAY WITH MORNING AND EVENING MEALS TABLET, COATED ORAL 2
Qty: 0 | Refills: 0 | Status: ACTIVE | COMMUNITY
Start: 1900-01-01

## 2022-04-04 RX ORDER — ERENUMAB-AOOE 140 MG/ML
AS DIRECTED INJECTION, SOLUTION SUBCUTANEOUS
Status: DISCONTINUED | COMMUNITY

## 2022-04-04 RX ORDER — GLUCOSAMINE/D3/BOSWELLIA SERRA 1500MG-400
1 TABLET TABLET ORAL ONCE A DAY
Status: ACTIVE | COMMUNITY

## 2022-04-04 RX ORDER — IRBESARTAN 150 MG/1
1 TABLET TABLET, FILM COATED ORAL ONCE A DAY
Status: ACTIVE | COMMUNITY

## 2022-04-04 RX ORDER — NARATRIPTAN 2.5 MG/1
1 TABLET TABLET ORAL ONCE A DAY
Status: ACTIVE | COMMUNITY

## 2022-04-04 RX ORDER — HYDROCODONE BITARTRATE AND ACETAMINOPHEN 5; 325 MG/1; MG/1
1 TABLET AS NEEDED TABLET ORAL
Status: ACTIVE | COMMUNITY

## 2022-04-04 RX ORDER — TIZANIDINE 4 MG/1
1 TABLET AS NEEDED TABLET ORAL QHS
Status: ACTIVE | COMMUNITY

## 2022-04-04 NOTE — HPI-TODAY'S VISIT:
Dear Mariia Henson, Thank you again for sending us these January 10 labs. You read the essential labs to me at visit but this is the full set. White blood cell count was 9.4 hemoglobin 13.6 hematocrit 44.6 platelet count 418.  These are normal range.  Curiously your mean corpuscular hemoglobin was 26.2 which is a little low and your mean corpuscular hemoglobin concentration likewise was a little low at 30.5.  Please review with primary provider.  Neutrophils normal at 5.6 and lymphocytes 2.8. Glucose remains up at 202 and BUN of 24 creatinine 0.95 sodium 140 potassium 5.1 chloride 99 CO2 of 21 calcium elevated at 10.4.  You on calcium supplements?  That sometimes can raise that. Albumin 4.8 bilirubin 0.2 alk phos 60 AST 30 ALT elevated at 34.  Ideal ALT is less than 25. Cholesterol 123, triglycerides 100, HDL 52, LDL 52. Hemoglobin A1c up to 9.3 as you mention.  So that went up. TSH 1.77. I think again that the missing ingredient may be the exercise daily  activity such as the walking for 30 minutes.  Please discuss with your doctors and see if that ok to do and if so lets see if it helps labs. Dr. Stevens

## 2022-04-04 NOTE — HPI-OTHER HISTORIES
The patient is a 61 year old /White female , who presents on referral from Moo Gonzalez MD , after a last visit Dec 2021 with Ms Grace MARES  for a follow up telemed evaluation for ZAMORA.          Dec visit 2021 pt had cough x 1 month.  She is on otc for this. She never learned as to cause and they have some cough remedies. She said she took a z-gualberto.   When saw provider had elevated hemoglobin a1c around 8%-having pump adjusted. She states was adjusted and was running a 9% and she says looking at that.  Jan 2022 she did labs: a1c 9.3%. Ast and alt done and they were 30 and alt 34   oct 2021: cbc ok, glucose elevated 151 follow up with primary MD. ast 30, alt 35 elevated. t4 1.93 elevated tsh low 0.188  She is to do an u.s locally for us. She will call to be sure that we get it.  10-9 local u.s sent in to us:  Visualized portions of the IVC and abdominal aorta were clear to them. The pancreatic head and body were somewhat heterogeneous but without focal solid or cystic mass.  Distal pancreatic body and tail were obscured by gas. The liver was enlarged measuring 21.6 cm and appeared diffusely echogenic but without focal mass. Gallbladder was normal without stones.  Common bile duct was normal at 4 mm.  Portal vein had normal directional flow seen. Spleen was normal at 9.2 cm. Right kidney was 9.8 x 5.1 x 5.0 cm with no focal mass.  Left kidney 9.1 x 5.2 x 5.37cm with no focal mass. They mention that you have persistent hepatomegaly with diffuse steatosis or fat of the liver.  They mention again that your pancreas was mildly heterogeneous and that portions were not seen due to gas. As you recall your prior ultrasound had said the liver was mildly enlarged as well and also diffusely echogenic.  They said it was 22.3 cm last time and so it does appear to be possibly slightly smaller at this time but that can vary depending on how they measure it. We need to keep working on those issues that we talked about at the visit.  Hopefully with continued work this will improve as well.  September 27 labs show white blood cell count 8.1 hemoglobin 12.4 platelet count 419.  Previously hemoglobin 12.6 and platelet count 370.  Your MCH remains low at 25.5 and MCHC remains low at 30.1.  Please share with primary provider.  Neutrophils 4.7 and lymphocytes 2.4 both normal. Sugar elevated at 179 previously was 149.  Please share with primary provider as well. BUN of 20 creatinine 0.94 sodium 138 potassium 4.9 albumin 4.3 bilirubin 0.3 alk phosphatase 49 AST 23 and the ALT 33.  Previously AST 16 and ALT 21 so slightly up on the AST and ALT.  Hx a prior liver biopsy on 02/11/2013 showed 10% micro and macrovesicular steatosis, no portal inflammation,no granulamata or malignant infiltrates, no fibrosis.   She states no history of  alcohol use.   Per the patient she had a colonoscopy in 2016 and she says that is next is due in 10 yrs.   March 22 2021: wbc 7.5 and hg 12.6 and plat 370 and mcv 85. Neutrophils 4.3. tp 6.6 and alb 4.3 and tb 0.3 and alk 52 and ast 16 and alt 21 and ideal alt les than 25. Glu 149 elevated and bun 20 and cr 0.99. March 2020 ast and alt  24 and  26 so lower now.  She says that she is not exercising more and has fit bit and trying to get 5000 steps a day. That may be the difference. Needs that 30 min a day of walking.  Aug 3 2020  local labs: po4 3.6 and Uric aid 4.2, ferritin 99, mg 1.8 and a1c 8.6 % elevated. iron sat 19%.  glucose 223 elevated and show local md, cr 0.97 and na 140 and k 5.1 and cl 102 ad co2 22 and ca 9.8 and alb 4.2 and tb 0.2 abd alk 49 and ast 22 and alt 25 and prior ast 28 and alt 33 so lower now. urine protein elevated 408 mg/24 hours ( normal so you have more protein in urine noted.)  Saw  also second labs aug 3 glu 217 and cr 0.99 and na 138 and k 5.2 and cl 100 and co2 22 and ca 9.8 and alb 4.4 and tb 0.2 and alk 48 and ast 18 and alt 23, wbc 8.1 hg 12.5 plat 414.  These were ordered by two diff doctors.  Sept 2020:  U.s sent in: mildly enlarged liver. Liver diffusely echogenic and fatty. Spleen normal 9.6cm. No kidney hydronephrosis. No gallstones and common duct 4.5mm. Liver mildly enlarged 22.3 cm.   Prior u.s stated liver moderately enlarged. They prior mentioned 6.4mm kidney stone left kidney but not mentioned now.  Liver 21 cm so measurement could  be a little off on ultrasound.  June 2020 ast 28 and alt 33 and alk 48 and tb 0.2. alb 4.1.  March 2020 labs: wbc 8.5 hg 11.6 plat 359 and glu 273 elevated and show local md.  MCH 26 (26.6-33) mchc 30 (31.5-35.7) and should mention to PRIMARY MD. cr 0.94 and bun 19, na 137 k 4.4 and alb 4.2 and tb  less than 0.2 and alk 49 and ast 24 and alt 46. inr 1.0 and hepatitis a immune.   Plan: 1. Pt will work on her sugar control issues. 2. Think the exercise or the lack thereof. 3. That 30 min a day of walking is very important. 4. APril u.s and we will do in oct.  Stressed to pt the need for social distancing and strict handwashing and wearing a mask and to follow any other new or added CDC recommendations as this is an evolving target.  Duration of the visit was 30 minutes with 5 minutes of chart prep reviewing notes and labs and scan and then for 25 minutes by jolynn video for this TeleMed visit with time reviewing her recent records and labs and discussing her current status and future plans for the patient.

## 2022-05-17 ENCOUNTER — TELEPHONE ENCOUNTER (OUTPATIENT)
Dept: URBAN - METROPOLITAN AREA CLINIC 86 | Facility: CLINIC | Age: 62
End: 2022-05-17

## 2022-06-02 ENCOUNTER — LAB OUTSIDE AN ENCOUNTER (OUTPATIENT)
Dept: URBAN - METROPOLITAN AREA TELEHEALTH 2 | Facility: TELEHEALTH | Age: 62
End: 2022-06-02

## 2022-08-12 ENCOUNTER — TELEPHONE ENCOUNTER (OUTPATIENT)
Dept: URBAN - METROPOLITAN AREA CLINIC 86 | Facility: CLINIC | Age: 62
End: 2022-08-12

## 2022-08-22 ENCOUNTER — LAB OUTSIDE AN ENCOUNTER (OUTPATIENT)
Dept: URBAN - METROPOLITAN AREA CLINIC 86 | Facility: CLINIC | Age: 62
End: 2022-08-22

## 2022-08-29 ENCOUNTER — LAB OUTSIDE AN ENCOUNTER (OUTPATIENT)
Dept: URBAN - METROPOLITAN AREA TELEHEALTH 2 | Facility: TELEHEALTH | Age: 62
End: 2022-08-29

## 2022-09-26 ENCOUNTER — LAB OUTSIDE AN ENCOUNTER (OUTPATIENT)
Dept: URBAN - METROPOLITAN AREA TELEHEALTH 2 | Facility: TELEHEALTH | Age: 62
End: 2022-09-26

## 2022-10-04 ENCOUNTER — LAB OUTSIDE AN ENCOUNTER (OUTPATIENT)
Dept: URBAN - METROPOLITAN AREA TELEHEALTH 2 | Facility: TELEHEALTH | Age: 62
End: 2022-10-04

## 2022-10-04 ENCOUNTER — OFFICE VISIT (OUTPATIENT)
Dept: URBAN - METROPOLITAN AREA TELEHEALTH 2 | Facility: TELEHEALTH | Age: 62
End: 2022-10-04
Payer: COMMERCIAL

## 2022-10-04 VITALS — BODY MASS INDEX: 37.03 KG/M2 | WEIGHT: 209 LBS | HEIGHT: 63 IN

## 2022-10-04 DIAGNOSIS — R16.0 HEPATOMEGALY: ICD-10-CM

## 2022-10-04 DIAGNOSIS — E66.9 OBESE BODY HABITUS: ICD-10-CM

## 2022-10-04 DIAGNOSIS — E11.9 ABNORMAL METABOLIC STATE DUE TO DIABETES MELLITUS: ICD-10-CM

## 2022-10-04 DIAGNOSIS — K75.81 NASH (NONALCOHOLIC STEATOHEPATITIS): ICD-10-CM

## 2022-10-04 PROBLEM — 851000119109: Status: ACTIVE | Noted: 2020-09-26

## 2022-10-04 PROBLEM — 254956000: Status: ACTIVE | Noted: 2020-09-26

## 2022-10-04 PROBLEM — 415115007: Status: ACTIVE | Noted: 2020-09-26

## 2022-10-04 PROBLEM — 237620003: Status: ACTIVE | Noted: 2020-09-26

## 2022-10-04 PROBLEM — 66931009: Status: ACTIVE | Noted: 2020-09-26

## 2022-10-04 PROCEDURE — 99214 OFFICE O/P EST MOD 30 MIN: CPT

## 2022-10-04 RX ORDER — NARATRIPTAN 2.5 MG/1
1 TABLET TABLET ORAL ONCE A DAY
Status: ACTIVE | COMMUNITY

## 2022-10-04 RX ORDER — IRBESARTAN 150 MG/1
1 TABLET TABLET, FILM COATED ORAL ONCE A DAY
Status: ACTIVE | COMMUNITY

## 2022-10-04 RX ORDER — GLUCOSAMINE/D3/BOSWELLIA SERRA 1500MG-400
1 TABLET TABLET ORAL ONCE A DAY
Status: ACTIVE | COMMUNITY

## 2022-10-04 RX ORDER — METFORMIN HYDROCHLORIDE 1000 MG/1
TAKE 1 TABLET (1,000 MG) BY ORAL ROUTE 2 TIMES PER DAY WITH MORNING AND EVENING MEALS TABLET, COATED ORAL 2
Qty: 0 | Refills: 0 | Status: ACTIVE | COMMUNITY
Start: 1900-01-01

## 2022-10-04 RX ORDER — PROPRANOLOL HYDROCHLORIDE 20 MG/1
1 TABLET TABLET ORAL TWICE A DAY
Status: ACTIVE | COMMUNITY

## 2022-10-04 RX ORDER — LEVOTHYROXINE SODIUM 0.14 MG/1
TAKE 1 TABLET (137 MCG) BY ORAL ROUTE ONCE DAILY TABLET ORAL 1
Qty: 0 | Refills: 0 | Status: ACTIVE | COMMUNITY
Start: 1900-01-01

## 2022-10-04 RX ORDER — EVENING PRIMROSE OIL 500 MG
AS DIRECTED CAPSULE ORAL
Status: ACTIVE | COMMUNITY

## 2022-10-04 RX ORDER — TIZANIDINE 4 MG/1
1 TABLET AS NEEDED TABLET ORAL QHS
Status: ACTIVE | COMMUNITY

## 2022-10-04 RX ORDER — HYDROCODONE BITARTRATE AND ACETAMINOPHEN 5; 325 MG/1; MG/1
1 TABLET AS NEEDED TABLET ORAL
Status: ACTIVE | COMMUNITY

## 2022-10-04 NOTE — HPI-TODAY'S VISIT:
The patient is a 62 year old /White female , who presents on referral from Moo Gonzalez MD , after a last visit april 2022 for BELTRAN.    She has done labs and did for other doctor and told all ok except for the sugar.  She did go to [facility] and did the u.s. They did not send u.s that result. Carla is to call for this.  January 10 labs. You read the essential labs to me at visit but this is the full set. White blood cell count was 9.4 hemoglobin 13.6 hematocrit 44.6 platelet count 418.  These are normal range.  Curiously your mean corpuscular hemoglobin was 26.2 which is a little low and your mean corpuscular hemoglobin concentration likewise was a little low at 30.5.  Please review with primary provider.  Neutrophils normal at 5.6 and lymphocytes 2.8. Glucose remains up at 202 and BUN of 24 creatinine 0.95 sodium 140 potassium 5.1 chloride 99 CO2 of 21 calcium elevated at 10.4.  You on calcium supplements?  That sometimes can raise that. Albumin 4.8 bilirubin 0.2 alk phos 60 AST 30 ALT elevated at 34.  Ideal ALT is less than 25. Cholesterol 123, triglycerides 100, HDL 52, LDL 52. Hemoglobin A1c up to 9.3 as you mention.  So that went up. TSH 1.77. I think again that the missing ingredient may be the exercise daily  activity such as the walking for 30 minutes.  Please discuss with your doctors and see if that ok to do and if so lets see if it helps labs.  She says a1c 8.08% ?  Dec visit 2021 pt had cough x 1 month.  She is on otc for this. She never learned as to cause and they have some cough remedies. She said she took a z-gualberto.   Jan 2022 she did labs: a1c 9.3%. Ast and alt done and they were 30 and alt 34   oct 2021: cbc ok, glucose elevated 151 follow up with primary MD. ast 30, alt 35 elevated. t4 1.93 elevated tsh low 0.188  She is to do an u.s locally for us. She will call to be sure that we get it.  10-9 2021 local u.s sent in to us:  Visualized portions of the IVC and abdominal aorta were clear to them. The pancreatic head and body were somewhat heterogeneous but without focal solid or cystic mass.  Distal pancreatic body and tail were obscured by gas. The liver was enlarged measuring 21.6 cm and appeared diffusely echogenic but without focal mass. Gallbladder was normal without stones.  Common bile duct was normal at 4 mm.  Portal vein had normal directional flow seen. Spleen was normal at 9.2 cm. Right kidney was 9.8 x 5.1 x 5.0 cm with no focal mass.  Left kidney 9.1 x 5.2 x 5.37cm with no focal mass. They mention that you have persistent hepatomegaly with diffuse steatosis or fat of the liver.  They mention again that your pancreas was mildly heterogeneous and that portions were not seen due to gas. As you recall your prior ultrasound had said the liver was mildly enlarged as well and also diffusely echogenic.  They said it was 22.3 cm last time and so it does appear to be possibly slightly smaller at this time but that can vary depending on how they measure it. We need to keep working on those issues that we talked about at the visit.  Hopefully with continued work this will improve as well. Did at [facility].  September 27 labs show white blood cell count 8.1 hemoglobin 12.4 platelet count 419.  Previously hemoglobin 12.6 and platelet count 370.  Your MCH remains low at 25.5 and MCHC remains low at 30.1.  Please share with primary provider.  Neutrophils 4.7 and lymphocytes 2.4 both normal. Sugar elevated at 179 previously was 149.  Please share with primary provider as well. BUN of 20 creatinine 0.94 sodium 138 potassium 4.9 albumin 4.3 bilirubin 0.3 alk phosphatase 49 AST 23 and the ALT 33.  Previously AST 16 and ALT 21 so slightly up on the AST and ALT.  Hx a prior liver biopsy on 02/11/2013 showed 10% micro and macrovesicular steatosis, no portal inflammation,no granulamata or malignant infiltrates, no fibrosis.   She states no history of  alcohol use.   Weight has been going up and gained up and been to several conferences.  Per the patient she had a colonoscopy in 2016 and she says that is next is due in 10 yrs.   March 22 2021: wbc 7.5 and hg 12.6 and plat 370 and mcv 85. Neutrophils 4.3. tp 6.6 and alb 4.3 and tb 0.3 and alk 52 and ast 16 and alt 21 and ideal alt les than 25. Glu 149 elevated and bun 20 and cr 0.99. March 2020 ast and alt  24 and  26 so lower now.  She says is working at exercise and doing 5k and now occ 7-9K.   even walking can help.  Aug 3 2020  local labs: po4 3.6 and Uric aid 4.2, ferritin 99, mg 1.8 and a1c 8.6 % elevated. iron sat 19%.  glucose 223 elevated and show local md, cr 0.97 and na 140 and k 5.1 and cl 102 ad co2 22 and ca 9.8 and alb 4.2 and tb 0.2 abd alk 49 and ast 22 and alt 25 and prior ast 28 and alt 33 so lower now. urine protein elevated 408 mg/24 hours ( normal so you have more protein in urine noted.)  Saw  also second labs aug 3 glu 217 and cr 0.99 and na 138 and k 5.2 and cl 100 and co2 22 and ca 9.8 and alb 4.4 and tb 0.2 and alk 48 and ast 18 and alt 23, wbc 8.1 hg 12.5 plat 414.  These were ordered by two diff doctors.  Sept 2020:  U.s sent in: mildly enlarged liver. Liver diffusely echogenic and fatty. Spleen normal 9.6cm. No kidney hydronephrosis. No gallstones and common duct 4.5mm. Liver mildly enlarged 22.3 cm.   Prior u.s stated liver moderately enlarged. They prior mentioned 6.4mm kidney stone left kidney but not mentioned now.  Liver 21 cm so measurement could  be a little off on ultrasound.  June 2020 ast 28 and alt 33 and alk 48 and tb 0.2. alb 4.1.  March 2020 labs: wbc 8.5 hg 11.6 plat 359 and glu 273 elevated and show local md.  MCH 26 (26.6-33) mchc 30 (31.5-35.7) and should mention to PRIMARY MD. cr 0.94 and bun 19, na 137 k 4.4 and alb 4.2 and tb  less than 0.2 and alk 49 and ast 24 and alt 46. inr 1.0 and hepatitis a immune.   Plan: 1. Pt had the u.s and we asked carla to call for it. 2. Pt will send us the labs. 3. U.s in 6m again and do locally vs labs locally week prior. 4. Do the u.s day of visit. 5.  She will continue to work on the dm and she is seeing endocrine. New beltran and meds and they are coming out shortly.  Stressed to pt the need for social distancing and strict handwashing and wearing a mask and to follow any other new or added CDC recommendations as this is an evolving target.  Duration of the visit was 34 minutes with 10 minutes of chart prep reviewing notes and labs and scan and then for 24 minutes by doximity video for this TeleMed visit with time reviewing her recent records and labs and discussing her current status and future plans for the patient.

## 2022-10-10 ENCOUNTER — LAB OUTSIDE AN ENCOUNTER (OUTPATIENT)
Dept: URBAN - METROPOLITAN AREA CLINIC 86 | Facility: CLINIC | Age: 62
End: 2022-10-10

## 2022-10-11 ENCOUNTER — TELEPHONE ENCOUNTER (OUTPATIENT)
Dept: URBAN - METROPOLITAN AREA CLINIC 92 | Facility: CLINIC | Age: 62
End: 2022-10-11

## 2022-10-11 LAB
A/G RATIO: 1.8
ALBUMIN: 4.4
ALKALINE PHOSPHATASE: 44
ALT (SGPT): 15
AST (SGOT): 17
BASO (ABSOLUTE): 0.1
BASOS: 1
BILIRUBIN, TOTAL: 0.4
BUN/CREATININE RATIO: 19
BUN: 19
CALCIUM: 9.5
CARBON DIOXIDE, TOTAL: 23
CHLORIDE: 100
CREATININE: 0.99
EGFR: 65
EOS (ABSOLUTE): 0.4
EOS: 4
GLOBULIN, TOTAL: 2.5
GLUCOSE: 165
HEMATOCRIT: 38.6
HEMATOLOGY COMMENTS:: (no result)
HEMOGLOBIN: 11.9
IMMATURE CELLS: (no result)
IMMATURE GRANS (ABS): 0.1
IMMATURE GRANULOCYTES: 1
LYMPHS (ABSOLUTE): 2.5
LYMPHS: 31
MCH: 27.2
MCHC: 30.8
MCV: 88
MONOCYTES(ABSOLUTE): 0.6
MONOCYTES: 7
NEUTROPHILS (ABSOLUTE): 4.5
NEUTROPHILS: 56
NRBC: (no result)
PLATELETS: 364
POTASSIUM: 4.8
PROTEIN, TOTAL: 6.9
RBC: 4.37
RDW: 14.2
SODIUM: 138
WBC: 8

## 2022-10-11 NOTE — HPI-TODAY'S VISIT:
Dear Mariia Henson, October 10 labs show sugar elevated at 165 but down from 202 back in January.  Please share with primary provider. BUN 19 creatinine 0.99 sodium 138 potassium 4.8 calcium 9.5 albumin 4.4 bilirubin 0.4 alkaline phosphatase 44 down from 60.  AST noted to be down to 17 from 29 and ALT down to 15 from 37 so these labs are much better. Blood cell count 8 hemoglobin 11.9 platelet count 364.  Platelets fracture elevated last time in January at 451. MCV normal at 88.  The MCHC is still slightly low at 30.8 but up to 30.1.  Please share with primary provider. Neutrophils 4.5 and lymphocytes 2.5. Good to see that the labs are doing better.   Please share with primary providers locally. Dr. Stevens

## 2023-01-25 PROBLEM — 166643006 LIVER ENZYMES ABNORMAL: Status: ACTIVE | Noted: 2022-04-04

## 2023-01-25 PROBLEM — 39539005: Status: ACTIVE | Noted: 2020-09-26

## 2023-01-25 PROBLEM — 80515008: Status: ACTIVE | Noted: 2020-09-26

## 2023-01-25 PROBLEM — 271590003: Status: ACTIVE | Noted: 2020-09-26

## 2023-01-25 PROBLEM — 443913008: Status: ACTIVE | Noted: 2020-09-26

## 2023-02-27 ENCOUNTER — LAB OUTSIDE AN ENCOUNTER (OUTPATIENT)
Dept: URBAN - METROPOLITAN AREA TELEHEALTH 2 | Facility: TELEHEALTH | Age: 63
End: 2023-02-27

## 2023-03-01 ENCOUNTER — LAB OUTSIDE AN ENCOUNTER (OUTPATIENT)
Dept: URBAN - METROPOLITAN AREA TELEHEALTH 2 | Facility: TELEHEALTH | Age: 63
End: 2023-03-01

## 2023-03-02 ENCOUNTER — TELEPHONE ENCOUNTER (OUTPATIENT)
Dept: URBAN - METROPOLITAN AREA CLINIC 86 | Facility: CLINIC | Age: 63
End: 2023-03-02

## 2023-03-30 ENCOUNTER — CLAIMS CREATED FROM THE CLAIM WINDOW (OUTPATIENT)
Dept: URBAN - METROPOLITAN AREA CLINIC 91 | Facility: CLINIC | Age: 63
End: 2023-03-30
Payer: COMMERCIAL

## 2023-03-30 ENCOUNTER — WEB ENCOUNTER (OUTPATIENT)
Dept: URBAN - METROPOLITAN AREA CLINIC 86 | Facility: CLINIC | Age: 63
End: 2023-03-30

## 2023-03-30 ENCOUNTER — OFFICE VISIT (OUTPATIENT)
Dept: URBAN - METROPOLITAN AREA CLINIC 86 | Facility: CLINIC | Age: 63
End: 2023-03-30
Payer: COMMERCIAL

## 2023-03-30 VITALS
HEIGHT: 63 IN | DIASTOLIC BLOOD PRESSURE: 58 MMHG | TEMPERATURE: 97.3 F | WEIGHT: 216 LBS | HEART RATE: 61 BPM | BODY MASS INDEX: 38.27 KG/M2 | SYSTOLIC BLOOD PRESSURE: 138 MMHG

## 2023-03-30 DIAGNOSIS — E66.9 OBESE BODY HABITUS: ICD-10-CM

## 2023-03-30 DIAGNOSIS — K75.81 NASH (NONALCOHOLIC STEATOHEPATITIS): ICD-10-CM

## 2023-03-30 DIAGNOSIS — R74.8 ABNORMAL LIVER ENZYMES: ICD-10-CM

## 2023-03-30 DIAGNOSIS — R93.2 ABNORMAL LIVER ULTRASOUND: ICD-10-CM

## 2023-03-30 DIAGNOSIS — Z71.89 VACCINE COUNSELING: ICD-10-CM

## 2023-03-30 DIAGNOSIS — N28.9 ABNORMAL RENAL FUNCTION: ICD-10-CM

## 2023-03-30 DIAGNOSIS — R79.89 ELEVATED PLATELET COUNT: ICD-10-CM

## 2023-03-30 DIAGNOSIS — R16.0 HEPATOMEGALY: ICD-10-CM

## 2023-03-30 DIAGNOSIS — E83.52 HIGH CALCIUM LEVELS: ICD-10-CM

## 2023-03-30 DIAGNOSIS — E11.9 ABNORMAL METABOLIC STATE DUE TO DIABETES MELLITUS: ICD-10-CM

## 2023-03-30 DIAGNOSIS — D35.2 PITUITARY ADENOMA: ICD-10-CM

## 2023-03-30 DIAGNOSIS — Z98.890 HISTORY OF COLONOSCOPY: ICD-10-CM

## 2023-03-30 PROCEDURE — 76705 ECHO EXAM OF ABDOMEN: CPT

## 2023-03-30 PROCEDURE — 93975 VASCULAR STUDY: CPT

## 2023-03-30 PROCEDURE — 99214 OFFICE O/P EST MOD 30 MIN: CPT

## 2023-03-30 RX ORDER — NARATRIPTAN 2.5 MG/1
1 TABLET TABLET ORAL ONCE A DAY
Status: ACTIVE | COMMUNITY

## 2023-03-30 RX ORDER — AMLODIPINE BESYLATE 5 MG/1
1 TABLET TABLET ORAL ONCE A DAY
Status: ACTIVE | COMMUNITY

## 2023-03-30 RX ORDER — HYDROCODONE BITARTRATE AND ACETAMINOPHEN 5; 325 MG/1; MG/1
1 TABLET AS NEEDED TABLET ORAL
Status: ACTIVE | COMMUNITY

## 2023-03-30 RX ORDER — EVENING PRIMROSE OIL 500 MG
AS DIRECTED CAPSULE ORAL
Status: ACTIVE | COMMUNITY

## 2023-03-30 RX ORDER — METFORMIN HYDROCHLORIDE 1000 MG/1
TAKE 1 TABLET (1,000 MG) BY ORAL ROUTE 2 TIMES PER DAY WITH MORNING AND EVENING MEALS TABLET, COATED ORAL 2
Qty: 0 | Refills: 0 | Status: ACTIVE | COMMUNITY
Start: 1900-01-01

## 2023-03-30 RX ORDER — LEVOTHYROXINE SODIUM 0.14 MG/1
TAKE 1 TABLET (137 MCG) BY ORAL ROUTE ONCE DAILY TABLET ORAL 1
Qty: 0 | Refills: 0 | Status: ACTIVE | COMMUNITY
Start: 1900-01-01

## 2023-03-30 RX ORDER — PROPRANOLOL HYDROCHLORIDE 20 MG/1
1 TABLET TABLET ORAL TWICE A DAY
Status: ACTIVE | COMMUNITY

## 2023-03-30 RX ORDER — GLUCOSAMINE/D3/BOSWELLIA SERRA 1500MG-400
1 TABLET TABLET ORAL ONCE A DAY
Status: ACTIVE | COMMUNITY

## 2023-03-30 RX ORDER — TIZANIDINE 4 MG/1
1 TABLET AS NEEDED TABLET ORAL QHS
Status: ACTIVE | COMMUNITY

## 2023-03-30 RX ORDER — IRBESARTAN 150 MG/1
1 TABLET TABLET, FILM COATED ORAL ONCE A DAY
Status: ACTIVE | COMMUNITY

## 2023-03-30 NOTE — HPI-TODAY'S VISIT:
The patient is a 62 year old /White female , who presents on referral from Moo Gonzalez MD , after a last visit Oct 2022 for BELTRAN.    She will get the labs done at the local [facility].  She did u.s and pending.  She says a1c 7.5% recently.  October 10 2022  labs show sugar elevated at 165 but down from 202 back in January.  Please share with primary provider. BUN 19 creatinine 0.99 sodium 138 potassium 4.8 calcium 9.5 albumin 4.4 bilirubin 0.4 alkaline phosphatase 44 down from 60.  AST noted to be down to 17 from 29 and ALT down to 15 from 37 so these labs are much better. White Blood cell count 8 hemoglobin 11.9 platelet count 364.  Platelets fracture elevated last time in January at 451. MCV normal at 88.  The MCHC is still slightly low at 30.8 but up to 30.1.  Please share with primary provider. Neutrophils 4.5 and lymphocytes 2.5. Good to see that the labs are doing better.    She says [place] bought  [employer] and when tried to do there did not get. She wants to try to do at [place].  January 10 2022 labs. You read the essential labs to me at visit but this is the full set. White blood cell count was 9.4 hemoglobin 13.6 hematocrit 44.6 platelet count 418.  These are normal range.  Curiously your mean corpuscular hemoglobin was 26.2 which is a little low and your mean corpuscular hemoglobin concentration likewise was a little low at 30.5.  Please review with primary provider.  Neutrophils normal at 5.6 and lymphocytes 2.8. Glucose remains up at 202 and BUN of 24 creatinine 0.95 sodium 140 potassium 5.1 chloride 99 CO2 of 21 calcium elevated at 10.4.  You on calcium supplements?  That sometimes can raise that. Albumin 4.8 bilirubin 0.2 alk phos 60 AST 30 ALT elevated at 34.  Ideal ALT is less than 25. Cholesterol 123, triglycerides 100, HDL 52, LDL 52. Hemoglobin A1c up to 9.3 as you mention.  So that went up. TSH 1.77. I think again that the missing ingredient may be the exercise daily  activity such as the walking for 30 minutes.  Please discuss with your doctors and see if that ok to do and if so lets see if it helps labs.  She says a1c 8.08% ?  Dec visit 2021 pt had cough x 1 month.  She is on otc for this. She never learned as to cause and they have some cough remedies. She said she took a z-gualberto.   Jan 2022 she did labs: a1c 9.3%. Ast and alt done and they were 30 and alt 34   oct 2021: cbc ok, glucose elevated 151 follow up with primary MD. ast 30, alt 35 elevated. t4 1.93 elevated tsh low 0.188  She is to do an u.s locally for us. She will call to be sure that we get it.  10-9 2021 local u.s sent in to us:  Visualized portions of the IVC and abdominal aorta were clear to them. The pancreatic head and body were somewhat heterogeneous but without focal solid or cystic mass.  Distal pancreatic body and tail were obscured by gas. The liver was enlarged measuring 21.6 cm and appeared diffusely echogenic but without focal mass. Gallbladder was normal without stones.  Common bile duct was normal at 4 mm.  Portal vein had normal directional flow seen. Spleen was normal at 9.2 cm. Right kidney was 9.8 x 5.1 x 5.0 cm with no focal mass.  Left kidney 9.1 x 5.2 x 5.37cm with no focal mass. They mention that you have persistent hepatomegaly with diffuse steatosis or fat of the liver.  They mention again that your pancreas was mildly heterogeneous and that portions were not seen due to gas. As you recall your prior ultrasound had said the liver was mildly enlarged as well and also diffusely echogenic.  They said it was 22.3 cm last time and so it does appear to be possibly slightly smaller at this time but that can vary depending on how they measure it. We need to keep working on those issues that we talked about at the visit.  Hopefully with continued work this will improve as well. Did at [facility].  September 27 labs show white blood cell count 8.1 hemoglobin 12.4 platelet count 419.  Previously hemoglobin 12.6 and platelet count 370.  Your MCH remains low at 25.5 and MCHC remains low at 30.1.  Please share with primary provider.  Neutrophils 4.7 and lymphocytes 2.4 both normal. Sugar elevated at 179 previously was 149.  Please share with primary provider as well. BUN of 20 creatinine 0.94 sodium 138 potassium 4.9 albumin 4.3 bilirubin 0.3 alk phosphatase 49 AST 23 and the ALT 33.  Previously AST 16 and ALT 21 so slightly up on the AST and ALT.  Hx a prior liver biopsy on 02/11/2013 showed 10% micro and macrovesicular steatosis, no portal inflammation,no granulamata or malignant infiltrates, no fibrosis.   She states no history of  alcohol use.   Weight has been going up and gained up and been to several conferences.  Per the patient she had a colonoscopy in 2016 and she says that is next is due in 10 yrs.   March 22 2021: wbc 7.5 and hg 12.6 and plat 370 and mcv 85. Neutrophils 4.3. tp 6.6 and alb 4.3 and tb 0.3 and alk 52 and ast 16 and alt 21 and ideal alt les than 25. Glu 149 elevated and bun 20 and cr 0.99. March 2020 ast and alt  24 and  26 so lower now.  She says is working at exercise and doing 5k and now occ 7-9K.   even walking can help.  Aug 3 2020  local labs: po4 3.6 and Uric aid 4.2, ferritin 99, mg 1.8 and a1c 8.6 % elevated. iron sat 19%.  glucose 223 elevated and show local md, cr 0.97 and na 140 and k 5.1 and cl 102 ad co2 22 and ca 9.8 and alb 4.2 and tb 0.2 abd alk 49 and ast 22 and alt 25 and prior ast 28 and alt 33 so lower now. urine protein elevated 408 mg/24 hours ( normal so you have more protein in urine noted.)  Saw  also second labs aug 3 glu 217 and cr 0.99 and na 138 and k 5.2 and cl 100 and co2 22 and ca 9.8 and alb 4.4 and tb 0.2 and alk 48 and ast 18 and alt 23, wbc 8.1 hg 12.5 plat 414.  These were ordered by two diff doctors.  Sept 2020:  U.s sent in: mildly enlarged liver. Liver diffusely echogenic and fatty. Spleen normal 9.6cm. No kidney hydronephrosis. No gallstones and common duct 4.5mm. Liver mildly enlarged 22.3 cm.   Prior u.s stated liver moderately enlarged. They prior mentioned 6.4mm kidney stone left kidney but not mentioned now.  Liver 21 cm so measurement could  be a little off on ultrasound.  June 2020 ast 28 and alt 33 and alk 48 and tb 0.2. alb 4.1.  March 2020 labs: wbc 8.5 hg 11.6 plat 359 and glu 273 elevated and show local md.  MCH 26 (26.6-33) mchc 30 (31.5-35.7) and should mention to PRIMARY MD. cr 0.94 and bun 19, na 137 k 4.4 and alb 4.2 and tb  less than 0.2 and alk 49 and ast 24 and alt 46. inr 1.0 and hepatitis a immune.   Plan: 1. Pt did u.s today on 16 and next time wants to try to at [place] at [place]. 2. Pt will do labs now [facility]. 3. Pt will do labs pre nexct visit. 4. Telemed in 6m. 5.  She will continue to work on the dm and she is seeing endocrine. New beltran and meds and they are coming out shortly.  Stressed to pt the need for social distancing and strict handwashing and wearing a mask and to follow any other new or added CDC recommendations as this is an evolving target.  Duration of the visit was 35 minutes with 10 minutes of chart prep reviewing notes and labs and scan and then for 25 minutes for this face to face visit with time reviewing her recent records and labs and discussing her current status and future plans for the patient.

## 2023-03-30 NOTE — EXAM-PHYSICAL EXAM
Gen: awake and responsive. Eyes: anicteric, normal lids. Mouth: covered with mask. Nose: covered with mask. Hearing: intact grossly. Neck: trachea midline and no jvd. CV: RRR no s3. Lungs: clear. No wheezes, Abd: Soft, nabs,mildly obese, NT. No  liver or spleen enlargement appreciable. Ext: no sig leg edema, some palm erythema. Neuro: moves all 4 ext grossly. No asterixis. Skin: no pruritis and some palm erythema.

## 2023-03-31 ENCOUNTER — TELEPHONE ENCOUNTER (OUTPATIENT)
Dept: URBAN - METROPOLITAN AREA CLINIC 86 | Facility: CLINIC | Age: 63
End: 2023-03-31

## 2023-03-31 NOTE — HPI-TODAY'S VISIT:
Dear Mariia Henson,   The pancreas not well see. Liver apperas fatty. No lesions. The common duct 6mm, this is upper limits of normal in size. Right kidney 10.2. Hepatic Vasculature patent. Spleen 9.3 cm  Overall they saw fatty liver, be sure to work on the diet, exercise, weight loss to help with this.  Concepcion Carmichael PA-C

## 2023-04-02 ENCOUNTER — TELEPHONE ENCOUNTER (OUTPATIENT)
Dept: URBAN - METROPOLITAN AREA CLINIC 86 | Facility: CLINIC | Age: 63
End: 2023-04-02

## 2023-04-02 NOTE — HPI-TODAY'S VISIT:
Dear Mariia Henson, [birth year] March 30 labs show hep B surface antibody with no immunity noted. Sugar was elevated at 126 and please share with primary provider.  Maybe not fasting that day?  BUN was 21 creatinine slightly up at 1.10.  Sodium 140 potassium 4.9 calcium 9.5 albumin 3.9 bilirubin 0.3 and alkaline phosphatase was low at 36.  Have you checked your vitamin D level recently primary provider?  May want to do so as sometimes a low vitamin D level can cause it. AST was normal at 20 and ALT was normal at 19. WBC was normal at 7.4 but hemoglobin a little low at 11.3.  Please share with primary provider.  Platelet count was 315 normal.  MCV normal 87.4.  Your MCH and MCHC were slightly low please share with primary provider.  Neutrophils and lymphocytes were normal. Dr Stevens

## 2023-09-19 ENCOUNTER — TELEPHONE ENCOUNTER (OUTPATIENT)
Dept: URBAN - METROPOLITAN AREA CLINIC 86 | Facility: CLINIC | Age: 63
End: 2023-09-19

## 2023-09-19 LAB
A/G RATIO: 1.9
ALBUMIN: 4.3
ALKALINE PHOSPHATASE: 44
ALT (SGPT): 20
AST (SGOT): 20
BASO (ABSOLUTE): 0.1
BASOS: 1
BILIRUBIN, TOTAL: 0.4
BUN/CREATININE RATIO: 17
BUN: 17
CALCIUM: 9.6
CARBON DIOXIDE, TOTAL: 24
CHLORIDE: 99
CREATININE: 1.02
EGFR: 62
EOS (ABSOLUTE): 0.2
EOS: 3
GLOBULIN, TOTAL: 2.3
GLUCOSE: 213
HEMATOCRIT: 40.2
HEMATOLOGY COMMENTS:: (no result)
HEMOGLOBIN: 12.5
IMMATURE CELLS: (no result)
IMMATURE GRANS (ABS): 0.1
IMMATURE GRANULOCYTES: 1
LYMPHS (ABSOLUTE): 2
LYMPHS: 27
MCH: 27.5
MCHC: 31.1
MCV: 89
MONOCYTES(ABSOLUTE): 0.5
MONOCYTES: 7
NEUTROPHILS (ABSOLUTE): 4.5
NEUTROPHILS: 61
NRBC: (no result)
PLATELETS: 355
POTASSIUM: 4.9
PROTEIN, TOTAL: 6.6
RBC: 4.54
RDW: 13.6
SODIUM: 136
WBC: 7.3

## 2023-09-25 ENCOUNTER — LAB OUTSIDE AN ENCOUNTER (OUTPATIENT)
Dept: URBAN - METROPOLITAN AREA CLINIC 86 | Facility: CLINIC | Age: 63
End: 2023-09-25

## 2023-09-29 ENCOUNTER — OFFICE VISIT (OUTPATIENT)
Dept: URBAN - METROPOLITAN AREA TELEHEALTH 2 | Facility: TELEHEALTH | Age: 63
End: 2023-09-29
Payer: COMMERCIAL

## 2023-09-29 VITALS
WEIGHT: 206 LBS | HEIGHT: 63 IN | BODY MASS INDEX: 36.5 KG/M2 | DIASTOLIC BLOOD PRESSURE: 88 MMHG | SYSTOLIC BLOOD PRESSURE: 139 MMHG

## 2023-09-29 DIAGNOSIS — K75.81 NASH (NONALCOHOLIC STEATOHEPATITIS): ICD-10-CM

## 2023-09-29 DIAGNOSIS — R74.8 ABNORMAL LIVER ENZYMES: ICD-10-CM

## 2023-09-29 DIAGNOSIS — R16.0 HEPATOMEGALY: ICD-10-CM

## 2023-09-29 DIAGNOSIS — R79.89 ELEVATED PLATELET COUNT: ICD-10-CM

## 2023-09-29 PROCEDURE — 99214 OFFICE O/P EST MOD 30 MIN: CPT

## 2023-09-29 RX ORDER — EVENING PRIMROSE OIL 500 MG
AS DIRECTED CAPSULE ORAL
Status: ACTIVE | COMMUNITY

## 2023-09-29 RX ORDER — HYDROCODONE BITARTRATE AND ACETAMINOPHEN 5; 325 MG/1; MG/1
1 TABLET AS NEEDED TABLET ORAL
Status: ACTIVE | COMMUNITY

## 2023-09-29 RX ORDER — NARATRIPTAN 2.5 MG/1
1 TABLET TABLET ORAL ONCE A DAY
Status: ACTIVE | COMMUNITY

## 2023-09-29 RX ORDER — TIZANIDINE 4 MG/1
1 TABLET AS NEEDED TABLET ORAL QHS
Status: ACTIVE | COMMUNITY

## 2023-09-29 RX ORDER — PROPRANOLOL HYDROCHLORIDE 20 MG/1
1 TABLET TABLET ORAL TWICE A DAY
Status: ACTIVE | COMMUNITY

## 2023-09-29 RX ORDER — GLUCOSAMINE/D3/BOSWELLIA SERRA 1500MG-400
1 TABLET TABLET ORAL ONCE A DAY
Status: ACTIVE | COMMUNITY

## 2023-09-29 RX ORDER — METFORMIN HYDROCHLORIDE 1000 MG/1
TAKE 1 TABLET (1,000 MG) BY ORAL ROUTE 2 TIMES PER DAY WITH MORNING AND EVENING MEALS TABLET, COATED ORAL 2
Qty: 0 | Refills: 0 | Status: ACTIVE | COMMUNITY
Start: 1900-01-01

## 2023-09-29 RX ORDER — LEVOTHYROXINE SODIUM 0.14 MG/1
TAKE 1 TABLET (137 MCG) BY ORAL ROUTE ONCE DAILY TABLET ORAL 1
Qty: 0 | Refills: 0 | Status: ACTIVE | COMMUNITY
Start: 1900-01-01

## 2023-09-29 RX ORDER — DULAGLUTIDE 0.75 MG/.5ML
AS DIRECTED INJECTION, SOLUTION SUBCUTANEOUS
Status: ACTIVE | COMMUNITY

## 2023-09-29 RX ORDER — AMLODIPINE BESYLATE 5 MG/1
1 TABLET TABLET ORAL ONCE A DAY
Status: ACTIVE | COMMUNITY

## 2023-09-29 RX ORDER — IRBESARTAN 150 MG/1
1 TABLET TABLET, FILM COATED ORAL ONCE A DAY
Status: ACTIVE | COMMUNITY

## 2023-09-29 NOTE — HPI-TODAY'S VISIT:
The patient is a 63 year old /White female seen march 2023 and sent in referral from Moo Gonzalez MD for ZAMORA.    She may be doing rotator surgery.  September 18 labs showed glucose elevated at 213 and up from 165 in October of last year. She says a1c 7.7%. She is on the pump and says that pump is regulating and that leads to swings. Please share with primary provider. BUN was 17 which is normal but creatinine slightly up at 1.02 from previous 0.99. Sodium 136 potassium 4.9 calcium 9.6 albumin 4.3 bilirubin 0.4 alk phos 44 with AST of 20 and ALT of 20 with ideal ALT less than 25.  Previously your AST had been 17 and ALT of 15. WBC 7.3, hemoglobin 12.5 platelet count normal at 355. Your MCH was slightly low at 31.1 but higher than 30.8 before. Neutrophils and lymphocytes normal. Please share with primary providers.  Did the imaging at [place]:  she sent by Fax from there. Jasmin to call.   March 30 labs show hep B surface antibody with no immunity noted. Sugar was elevated at 126 and please share with primary provider.  Maybe not fasting that day?  BUN was 21 creatinine slightly up at 1.10.  Sodium 140 potassium 4.9 calcium 9.5 albumin 3.9 bilirubin 0.3 and alkaline phosphatase was low at 36.  Have you checked your vitamin D level recently primary provider?  May want to do so as sometimes a low vitamin D level can cause it. AST was normal at 20 and ALT was normal at 19. WBC was normal at 7.4 but hemoglobin a little low at 11.3.  Please share with primary provider.  Platelet count was 315 normal.  MCV normal 87.4.  Your MCH and MCHC were slightly low please share with primary provider.  Neutrophils and lymphocytes were normal.  March 2023 The pancreas not well see. Liver appears fatty. No lesions. The common duct 6mm, this is upper limits of normal in size. Right kidney 10.2. Hepatic Vasculature patent. Spleen 9.3 cm  Overall they saw fatty liver, be sure to work on the diet, exercise, weight loss to help with this.  7.7% and prior was a1c 7.5% recently.  October 10 2022  labs show sugar elevated at 165 but down from 202 back in January.  Please share with primary provider. BUN 19 creatinine 0.99 sodium 138 potassium 4.8 calcium 9.5 albumin 4.4 bilirubin 0.4 alkaline phosphatase 44 down from 60.  AST noted to be down to 17 from 29 and ALT down to 15 from 37 so these labs are much better. White Blood cell count 8 hemoglobin 11.9 platelet count 364.  Platelets fracture elevated last time in January at 451. MCV normal at 88.  The MCHC is still slightly low at 30.8 but up to 30.1.  Please share with primary provider. Neutrophils 4.5 and lymphocytes 2.5. Good to see that the labs are doing better.    She says [place] [employer] bought  [employer] and when tried to do there did not get.   January 10 2022 labs. You read the essential labs to me at visit but this is the full set. White blood cell count was 9.4 hemoglobin 13.6 hematocrit 44.6 platelet count 418.  These are normal range.  Curiously your mean corpuscular hemoglobin was 26.2 which is a little low and your mean corpuscular hemoglobin concentration likewise was a little low at 30.5.  Please review with primary provider.  Neutrophils normal at 5.6 and lymphocytes 2.8. Glucose remains up at 202 and BUN of 24 creatinine 0.95 sodium 140 potassium 5.1 chloride 99 CO2 of 21 calcium elevated at 10.4.  You on calcium supplements?  That sometimes can raise that. Albumin 4.8 bilirubin 0.2 alk phos 60 AST 30 ALT elevated at 34.  Ideal ALT is less than 25. Cholesterol 123, triglycerides 100, HDL 52, LDL 52. Hemoglobin A1c up to 9.3 as you mention.  So that went up. TSH 1.77. I think again that the missing ingredient may be the exercise daily  activity such as the walking for 30 minutes.  Please discuss with your doctors and see if that ok to do and if so lets see if it helps labs.  She says a1c 8.08% ?  Dec visit 2021 pt had cough x 1 month.  She is on otc for this. She never learned as to cause and they have some cough remedies. She said she took a z-gualberto.   Jan 2022 she did labs: a1c 9.3%. Ast and alt done and they were 30 and alt 34   oct 2021: cbc ok, glucose elevated 151 follow up with primary MD. ast 30, alt 35 elevated. t4 1.93 elevated tsh low 0.188  She is to do an [employer].s locally for us. She will call to be sure that we get it.  10-9 2021 local u.s sent in to us:  Visualized portions of the IVC and abdominal aorta were clear to them. The pancreatic head and body were somewhat heterogeneous but without focal solid or cystic mass.  Distal pancreatic body and tail were obscured by gas. The liver was enlarged measuring 21.6 cm and appeared diffusely echogenic but without focal mass. Gallbladder was normal without stones.  Common bile duct was normal at 4 mm.  Portal vein had normal directional flow seen. Spleen was normal at 9.2 cm. Right kidney was 9.8 x 5.1 x 5.0 cm with no focal mass.  Left kidney 9.1 x 5.2 x 5.37cm with no focal mass. They mention that you have persistent hepatomegaly with diffuse steatosis or fat of the liver.  They mention again that your pancreas was mildly heterogeneous and that portions were not seen due to gas. As you recall your prior ultrasound had said the liver was mildly enlarged as well and also diffusely echogenic.  They said it was 22.3 cm last time and so it does appear to be possibly slightly smaller at this time but that can vary depending on how they measure it. We need to keep working on those issues that we talked about at the visit.  Hopefully with continued work this will improve as well. Did at [facility] [place].  September 27 labs show white blood cell count 8.1 hemoglobin 12.4 platelet count 419.  Previously hemoglobin 12.6 and platelet count 370.  Your MCH remains low at 25.5 and MCHC remains low at 30.1.  Please share with primary provider.  Neutrophils 4.7 and lymphocytes 2.4 both normal. Sugar elevated at 179 previously was 149.  Please share with primary provider as well. BUN of 20 creatinine 0.94 sodium 138 potassium 4.9 albumin 4.3 bilirubin 0.3 alk phosphatase 49 AST 23 and the ALT 33.  Previously AST 16 and ALT 21 so slightly up on the AST and ALT.  Hx a prior liver biopsy on 02/11/2013 showed 10% micro and macrovesicular steatosis, no portal inflammation,no granulamata or malignant infiltrates, no fibrosis.   She states no history of  alcohol use.   Weight has been going up and gained up and been to several conferences.  Per the patient she had a colonoscopy in 2016 and she says that is next is due in 10 yrs.   March 22 2021: wbc 7.5 and hg 12.6 and plat 370 and mcv 85. Neutrophils 4.3. tp 6.6 and alb 4.3 and tb 0.3 and alk 52 and ast 16 and alt 21 and ideal alt les than 25. Glu 149 elevated and bun 20 and cr 0.99. March 2020 ast and alt  24 and  26 so lower now.  She says is working at exercise and doing 5k and now occ 7-9K.   even walking can help.  Aug 3 2020  local labs: po4 3.6 and Uric aid 4.2, ferritin 99, mg 1.8 and a1c 8.6 % elevated. iron sat 19%.  glucose 223 elevated and show local md, cr 0.97 and na 140 and k 5.1 and cl 102 ad co2 22 and ca 9.8 and alb 4.2 and tb 0.2 abd alk 49 and ast 22 and alt 25 and prior ast 28 and alt 33 so lower now. urine protein elevated 408 mg/24 hours ( normal so you have more protein in urine noted.)  Saw  also second labs aug 3 glu 217 and cr 0.99 and na 138 and k 5.2 and cl 100 and co2 22 and ca 9.8 and alb 4.4 and tb 0.2 and alk 48 and ast 18 and alt 23, wbc 8.1 hg 12.5 plat 414.  These were ordered by two diff doctors.  Sept 2020:  U.s sent in: mildly enlarged liver. Liver diffusely echogenic and fatty. Spleen normal 9.6cm. No kidney hydronephrosis. No gallstones and common duct 4.5mm. Liver mildly enlarged 22.3 cm.   Prior u.s stated liver moderately enlarged. They prior mentioned 6.4mm kidney stone left kidney but not mentioned now.  Liver 21 cm so measurement could  be a little off on ultrasound.  June 2020 ast 28 and alt 33 and alk 48 and tb 0.2. alb 4.1.  March 2020 labs: wbc 8.5 hg 11.6 plat 359 and glu 273 elevated and show local md.  MCH 26 (26.6-33) mchc 30 (31.5-35.7) and should mention to PRIMARY MD. cr 0.94 and bun 19, na 137 k 4.4 and alb 4.2 and tb  less than 0.2 and alk 49 and ast 24 and alt 46. inr 1.0 and hepatitis a immune.   Plan: 1. Carla is calling for [facility] u.s and plan for the next one in 6m. 2. Pt will do labs in 6m and u.s. 3. Pt will stay on diet and exercise.   Duration of the visit was  32 minutes with 10 minutes of chart prep reviewing notes and labs and scan and then for 22 minutes for this telemed  visit with time reviewing her recent records and labs and discussing her current status and future plans for the patient.  Add: u.s no acute findings aug 18 2023: pancreas heterogeneous in echoteture and pancreas not well seen. Liver 20.5cm and appeared steatosis. No gallstones. CBD 5mm. Tight kidney 10.5cm and 8.8  left kidney. Liver 20.5cm and Spleen 9.1 cm.

## 2024-01-26 ENCOUNTER — LAB OUTSIDE AN ENCOUNTER (OUTPATIENT)
Dept: URBAN - METROPOLITAN AREA CLINIC 91 | Facility: CLINIC | Age: 64
End: 2024-01-26

## 2024-01-26 ENCOUNTER — TELEPHONE ENCOUNTER (OUTPATIENT)
Dept: URBAN - METROPOLITAN AREA CLINIC 91 | Facility: CLINIC | Age: 64
End: 2024-01-26

## 2024-03-01 ENCOUNTER — LAB (OUTPATIENT)
Dept: URBAN - METROPOLITAN AREA TELEHEALTH 2 | Facility: TELEHEALTH | Age: 64
End: 2024-03-01

## 2024-03-14 LAB
A/G RATIO: 1.7
ALBUMIN: 4.1
ALKALINE PHOSPHATASE: 51
ALT (SGPT): 17
AST (SGOT): 21
BASO (ABSOLUTE): 0.1
BASOS: 1
BILIRUBIN, TOTAL: 0.3
BUN/CREATININE RATIO: 18
BUN: 19
CALCIUM: 9.8
CARBON DIOXIDE, TOTAL: 23
CHLORIDE: 102
CREATININE: 1.04
EGFR: 61
EOS (ABSOLUTE): 0.2
EOS: 3
GLOBULIN, TOTAL: 2.4
GLUCOSE: 131
HEMATOCRIT: 39.1
HEMATOLOGY COMMENTS:: (no result)
HEMOGLOBIN: 12
IMMATURE CELLS: (no result)
IMMATURE GRANS (ABS): 0.1
IMMATURE GRANULOCYTES: 1
LYMPHS (ABSOLUTE): 2.2
LYMPHS: 26
MCH: 26.8
MCHC: 30.7
MCV: 88
MONOCYTES(ABSOLUTE): 0.6
MONOCYTES: 7
NEUTROPHILS (ABSOLUTE): 5.4
NEUTROPHILS: 62
NRBC: (no result)
PLATELETS: 367
POTASSIUM: 4.9
PROTEIN, TOTAL: 6.5
RBC: 4.47
RDW: 13.9
SODIUM: 141
WBC: 8.6

## 2024-03-28 ENCOUNTER — TELEP (OUTPATIENT)
Dept: URBAN - METROPOLITAN AREA TELEHEALTH 2 | Facility: TELEHEALTH | Age: 64
End: 2024-03-28
Payer: COMMERCIAL

## 2024-03-28 VITALS — WEIGHT: 207 LBS | HEIGHT: 63 IN | BODY MASS INDEX: 36.68 KG/M2

## 2024-03-28 DIAGNOSIS — N28.9 ABNORMAL RENAL FUNCTION: ICD-10-CM

## 2024-03-28 DIAGNOSIS — E11.9 ABNORMAL METABOLIC STATE DUE TO DIABETES MELLITUS: ICD-10-CM

## 2024-03-28 DIAGNOSIS — E83.52 HIGH CALCIUM LEVELS: ICD-10-CM

## 2024-03-28 DIAGNOSIS — Z98.890 HISTORY OF COLONOSCOPY: ICD-10-CM

## 2024-03-28 DIAGNOSIS — K75.81 NASH (NONALCOHOLIC STEATOHEPATITIS): ICD-10-CM

## 2024-03-28 DIAGNOSIS — R74.8 ABNORMAL LIVER ENZYMES: ICD-10-CM

## 2024-03-28 DIAGNOSIS — E66.9 OBESE BODY HABITUS: ICD-10-CM

## 2024-03-28 DIAGNOSIS — R16.0 HEPATOMEGALY: ICD-10-CM

## 2024-03-28 DIAGNOSIS — R79.89 ELEVATED PLATELET COUNT: ICD-10-CM

## 2024-03-28 DIAGNOSIS — N28.89 PERINEPHRIC FLUID COLLECTION: ICD-10-CM

## 2024-03-28 DIAGNOSIS — D35.2 PITUITARY ADENOMA: ICD-10-CM

## 2024-03-28 DIAGNOSIS — Z71.89 VACCINE COUNSELING: ICD-10-CM

## 2024-03-28 PROBLEM — 90708001: Status: ACTIVE | Noted: 2024-03-28

## 2024-03-28 PROCEDURE — 99214 OFFICE O/P EST MOD 30 MIN: CPT

## 2024-03-28 RX ORDER — NARATRIPTAN 2.5 MG/1
1 TABLET TABLET ORAL ONCE A DAY
Status: ACTIVE | COMMUNITY

## 2024-03-28 RX ORDER — PROPRANOLOL HYDROCHLORIDE 20 MG/1
1 TABLET TABLET ORAL TWICE A DAY
Status: ON HOLD | COMMUNITY

## 2024-03-28 RX ORDER — DULAGLUTIDE 0.75 MG/.5ML
AS DIRECTED INJECTION, SOLUTION SUBCUTANEOUS
Status: DISCONTINUED | COMMUNITY

## 2024-03-28 RX ORDER — IRBESARTAN 150 MG/1
1 TABLET TABLET, FILM COATED ORAL ONCE A DAY
Status: ACTIVE | COMMUNITY

## 2024-03-28 RX ORDER — TIZANIDINE 4 MG/1
1 TABLET AS NEEDED TABLET ORAL QHS
Status: ACTIVE | COMMUNITY

## 2024-03-28 RX ORDER — LEVOTHYROXINE SODIUM 0.14 MG/1
TAKE 1 TABLET (137 MCG) BY ORAL ROUTE ONCE DAILY TABLET ORAL 1
Qty: 0 | Refills: 0 | Status: ACTIVE | COMMUNITY
Start: 1900-01-01

## 2024-03-28 RX ORDER — AMLODIPINE BESYLATE 5 MG/1
1 TABLET TABLET ORAL ONCE A DAY
Status: ACTIVE | COMMUNITY

## 2024-03-28 RX ORDER — HYDROCODONE BITARTRATE AND ACETAMINOPHEN 5; 325 MG/1; MG/1
1 TABLET AS NEEDED TABLET ORAL
Status: ACTIVE | COMMUNITY

## 2024-03-28 RX ORDER — EVENING PRIMROSE OIL 500 MG
AS DIRECTED CAPSULE ORAL
Status: ACTIVE | COMMUNITY

## 2024-03-28 RX ORDER — GLUCOSAMINE/D3/BOSWELLIA SERRA 1500MG-400
1 TABLET TABLET ORAL ONCE A DAY
Status: ACTIVE | COMMUNITY

## 2024-03-28 RX ORDER — METFORMIN HYDROCHLORIDE 1000 MG/1
TAKE 1 TABLET (1,000 MG) BY ORAL ROUTE 2 TIMES PER DAY WITH MORNING AND EVENING MEALS TABLET, COATED ORAL 2
Qty: 0 | Refills: 0 | Status: ACTIVE | COMMUNITY
Start: 1900-01-01

## 2024-03-28 NOTE — HPI-TODAY'S VISIT:
The patient is a 63 year old /White female seen Sept 2023 and sent in referral from Moo Gonzalez MD for ZAMORA.    A copy of the note will be sent to the referring.  Local [facility] labs from [place] sent in after Carla called from February 6 2024. They had not sent it previously as they listed the referring physician is unavailable, physician Ren.This is not uncommon to see as hospitals in which I do not have active priviledges may list the referring provider as unavailable or unknown.The liver was enlarged measuring 20.6 cm and showed diffuse increased echogenicity compatible with fatty infiltration but no evidence of intrahepatic bile duct dilation.The pancreas was not seen due to bowel gas.Evaluation the gallbladder showed no evidence of any stones. Gallbladder wall thickness was normal at 3 mm. Common bile duct measured 6 mm.Abdominal aorta views showed no evidence of dilation or plaque. IVC was also within normal limits.Right kidney 9.3 cm with a 1.3 cm renal size given. No hydronephrosis. Left kidney was 8.1 cm with 1.3 cm renal cortex size given. No cysts were seen. On this kidney though as well as on the other kidney they did mention a mild perinephric fluid collection and would recommend that you review that with your primary provider.Spleen was normal in 9.6 cm.In summary, they mentioned that you had hepatomegaly with some fatty infiltration. They mentioned no stones and no hydronephrosis but you did have mild perinephric fluid.The did not give any guidance regarding this and so we important for you to review with your primary providers there and consider seeing a renal specialist.  I did go back to your prior ultrasound that we could see from last year and on that March 2023 scan that was sent,  the liver appeared fatty but they did not mention that the liver is enlarged. They did not mention any issues with the kidneys.We do need going forward to assume that all scans done there will NOT be spontaneously be sent to me and to make sure that we have a system in place to notify us about a day or 2 after you do this so that Carla can call and get the report. Even though they are part of the [facility] system they have not integrated themselves into the epic network in which I am in, as otherwise I would have showed up as a physician in their [facility] system  Add: u.s no acute findings aug 18 2023: pancreas heterogeneous in echoteture and pancreas not well seen. Liver 20.5cm and appeared steatosis. No gallstones. CBD 5mm. Tight kidney 10.5cm and 8.8  left kidney. Liver 20.5cm and Spleen 9.1 cm.  March 13 labs show glucose was elevated at 131 but down from 213 back in September. Please share with primary provider. BUN of 19 which is normal but creatinine slightly up again at 1.04 from 1.02 last time. Sodium 141 potassium 4.9 calcium 9.8 albumin 4.1 bilirubin 0.3 alk phos 51 AST 21 ALT of 17 and previously AST 20 and ALT 20. Ideal ALT less than 25 so doing well there. WBC 8.6, hemoglobin 12 platelet 367 and these are normal. MCV was 88. Neutrophils are 5.4 and lymphocytes 2.2. Looking forward to seeing you at your upcoming visit and reviewing these labs and your course with you.  She had been exercising less due to oct 2023 rotator cuff and doing less exercise and mainly doing the physical exercise and to be doing more.  She may be doing rotator surgery.  Asked what a1c is doing 7.7% still. Could be due to surgery.  September 18 labs showed glucose elevated at 213 and up from 165 in October of last year. She says a1c 7.7%. She is on the pump and says that pump is regulating and that leads to swings. Please share with primary provider. BUN was 17 which is normal but creatinine slightly up at 1.02 from previous 0.99. Sodium 136 potassium 4.9 calcium 9.6 albumin 4.3 bilirubin 0.4 alk phos 44 with AST of 20 and ALT of 20 with ideal ALT less than 25.  Previously your AST had been 17 and ALT of 15. WBC 7.3, hemoglobin 12.5 platelet count normal at 355. Your MCH was slightly low at 31.1 but higher than 30.8 before. Neutrophils and lymphocytes normal. Please share with primary providers.  Did the imaging at [facility]:  u.s no acute findings aug 18 2023: pancreas heterogeneous in echoteture and pancreas not well seen. Liver 20.5cm and appeared steatosis. No gallstones. CBD 5mm. Tight kidney 10.5cm and 8.8 left kidney. Liver 20.5cm and Spleen 9.1 cm.   March 30 labs show hep B surface antibody with no immunity noted. Sugar was elevated at 126 and please share with primary provider.  Maybe not fasting that day?  BUN was 21 creatinine slightly up at 1.10.  Sodium 140 potassium 4.9 calcium 9.5 albumin 3.9 bilirubin 0.3 and alkaline phosphatase was low at 36.  Have you checked your vitamin D level recently primary provider?  May want to do so as sometimes a low vitamin D level can cause it. AST was normal at 20 and ALT was normal at 19. WBC was normal at 7.4 but hemoglobin a little low at 11.3.  Please share with primary provider.  Platelet count was 315 normal.  MCV normal 87.4.  Your MCH and MCHC were slightly low please share with primary provider.  Neutrophils and lymphocytes were normal.  March 2023 The pancreas not well see. Liver appears fatty. No lesions. The common duct 6mm, this is upper limits of normal in size. Right kidney 10.2. Hepatic Vasculature patent. Spleen 9.3 cm  Overall they saw fatty liver, be sure to work on the diet, exercise, weight loss to help with this.  7.7% and prior was a1c 7.5% recently.  October 10 2022  labs show sugar elevated at 165 but down from 202 back in January.  Please share with primary provider. BUN 19 creatinine 0.99 sodium 138 potassium 4.8 calcium 9.5 albumin 4.4 bilirubin 0.4 alkaline phosphatase 44 down from 60.  AST noted to be down to 17 from 29 and ALT down to 15 from 37 so these labs are much better. White Blood cell count 8 hemoglobin 11.9 platelet count 364.  Platelets fracture elevated last time in January at 451. MCV normal at 88.  The MCHC is still slightly low at 30.8 but up to 30.1.  Please share with primary provider. Neutrophils 4.5 and lymphocytes 2.5. Good to see that the labs are doing better.    She says [employer] bought  [employer] and when tried to do there did not get.   January 10 2022 labs. You read the essential labs to me at visit but this is the full set. White blood cell count was 9.4 hemoglobin 13.6 hematocrit 44.6 platelet count 418.  These are normal range.  Curiously your mean corpuscular hemoglobin was 26.2 which is a little low and your mean corpuscular hemoglobin concentration likewise was a little low at 30.5.  Please review with primary provider.  Neutrophils normal at 5.6 and lymphocytes 2.8. Glucose remains up at 202 and BUN of 24 creatinine 0.95 sodium 140 potassium 5.1 chloride 99 CO2 of 21 calcium elevated at 10.4.  You on calcium supplements?  That sometimes can raise that. Albumin 4.8 bilirubin 0.2 alk phos 60 AST 30 ALT elevated at 34.  Ideal ALT is less than 25. Cholesterol 123, triglycerides 100, HDL 52, LDL 52. Hemoglobin A1c up to 9.3 as you mention.  So that went up. TSH 1.77. I think again that the missing ingredient may be the exercise daily  activity such as the walking for 30 minutes.  Please discuss with your doctors and see if that ok to do and if so lets see if it helps labs.  She says a1c 8.08% ?  Dec visit 2021 pt had cough x 1 month.  She is on otc for this. She never learned as to cause and they have some cough remedies. She said she took a z-gualberto.   Jan 2022 she did labs: a1c 9.3%. Ast and alt done and they were 30 and alt 34   oct 2021: cbc ok, glucose elevated 151 follow up with primary MD. ast 30, alt 35 elevated. t4 1.93 elevated tsh low 0.188  She is to do an u.s locally for us. She will call to be sure that we get it.  10-9 2021 local u.s sent in to us:  Visualized portions of the IVC and abdominal aorta were clear to them. The pancreatic head and body were somewhat heterogeneous but without focal solid or cystic mass.  Distal pancreatic body and tail were obscured by gas. The liver was enlarged measuring 21.6 cm and appeared diffusely echogenic but without focal mass. Gallbladder was normal without stones.  Common bile duct was normal at 4 mm.  Portal vein had normal directional flow seen. Spleen was normal at 9.2 cm. Right kidney was 9.8 x 5.1 x 5.0 cm with no focal mass.  Left kidney 9.1 x 5.2 x 5.37cm with no focal mass. They mention that you have persistent hepatomegaly with diffuse steatosis or fat of the liver.  They mention again that your pancreas was mildly heterogeneous and that portions were not seen due to gas. As you recall your prior ultrasound had said the liver was mildly enlarged as well and also diffusely echogenic.  They said it was 22.3 cm last time and so it does appear to be possibly slightly smaller at this time but that can vary depending on how they measure it. We need to keep working on those issues that we talked about at the visit.  Hopefully with continued work this will improve as well. Did at [facility].  September 27 labs show white blood cell count 8.1 hemoglobin 12.4 platelet count 419.  Previously hemoglobin 12.6 and platelet count 370.  Your MCH remains low at 25.5 and MCHC remains low at 30.1.  Please share with primary provider.  Neutrophils 4.7 and lymphocytes 2.4 both normal. Sugar elevated at 179 previously was 149.  Please share with primary provider as well. BUN of 20 creatinine 0.94 sodium 138 potassium 4.9 albumin 4.3 bilirubin 0.3 alk phosphatase 49 AST 23 and the ALT 33.  Previously AST 16 and ALT 21 so slightly up on the AST and ALT.  Hx a prior liver biopsy on 02/11/2013 showed 10% micro and macrovesicular steatosis, no portal inflammation,no granulamata or malignant infiltrates, no fibrosis.   She states no history of  alcohol use.   Weight has been going up and gained up and been to several conferences.  Per the patient she had a colonoscopy in 2016 and she says that is next is due in 10 yrs.   March 22 2021: wbc 7.5 and hg 12.6 and plat 370 and mcv 85. Neutrophils 4.3. tp 6.6 and alb 4.3 and tb 0.3 and alk 52 and ast 16 and alt 21 and ideal alt les than 25. Glu 149 elevated and bun 20 and cr 0.99. March 2020 ast and alt  24 and  26 so lower now.  She says is working at exercise and doing 5k and now occ 7-9K.   even walking can help.  Aug 3 2020  local labs: po4 3.6 and Uric aid 4.2, ferritin 99, mg 1.8 and a1c 8.6 % elevated. iron sat 19%.  glucose 223 elevated and show local md, cr 0.97 and na 140 and k 5.1 and cl 102 ad co2 22 and ca 9.8 and alb 4.2 and tb 0.2 abd alk 49 and ast 22 and alt 25 and prior ast 28 and alt 33 so lower now. urine protein elevated 408 mg/24 hours ( normal so you have more protein in urine noted.)  Saw  also second labs aug 3 glu 217 and cr 0.99 and na 138 and k 5.2 and cl 100 and co2 22 and ca 9.8 and alb 4.4 and tb 0.2 and alk 48 and ast 18 and alt 23, wbc 8.1 hg 12.5 plat 414.  These were ordered by two diff doctors.  Sept 2020:  U.s sent in: mildly enlarged liver. Liver diffusely echogenic and fatty. Spleen normal 9.6cm. No kidney hydronephrosis. No gallstones and common duct 4.5mm. Liver mildly enlarged 22.3 cm.   Prior u.s stated liver moderately enlarged. They prior mentioned 6.4mm kidney stone left kidney but not mentioned now.  Liver 21 cm so measurement could  be a little off on ultrasound.  June 2020 ast 28 and alt 33 and alk 48 and tb 0.2. alb 4.1.  March 2020 labs: wbc 8.5 hg 11.6 plat 359 and glu 273 elevated and show local md.  MCH 26 (26.6-33) mchc 30 (31.5-35.7) and should mention to PRIMARY MD. cr 0.94 and bun 19, na 137 k 4.4 and alb 4.2 and tb  less than 0.2 and alk 49 and ast 24 and alt 46. inr 1.0 and hepatitis a immune.   Plan: 1. Pt will do mri here sedated in 6m from last scan for us. 2. Pt will do fib 4 index need to estimate fibrosis as new mardrigal drug is out and slowly getting out to people. 3. It is for fatty liver but has to do and stage 2-3 fibrosis. 4. Pt will do diet and exercise and work on the sugars.   Duration of the visit was 33 minutes with 10 minutes of chart prep reviewing notes and labs and scan and then for 23  minutes for this dox telemed  visit with time reviewing her recent records and labs and discussing her current status and future plans for the patient.

## 2024-08-02 ENCOUNTER — TELEPHONE ENCOUNTER (OUTPATIENT)
Dept: URBAN - METROPOLITAN AREA CLINIC 86 | Facility: CLINIC | Age: 64
End: 2024-08-02

## 2024-08-02 ENCOUNTER — LAB OUTSIDE AN ENCOUNTER (OUTPATIENT)
Dept: URBAN - METROPOLITAN AREA CLINIC 86 | Facility: CLINIC | Age: 64
End: 2024-08-02

## 2024-08-03 ENCOUNTER — TELEPHONE ENCOUNTER (OUTPATIENT)
Dept: URBAN - METROPOLITAN AREA CLINIC 86 | Facility: CLINIC | Age: 64
End: 2024-08-03

## 2024-08-03 LAB
ABSOLUTE BASOPHILS: 55
ABSOLUTE EOSINOPHILS: 143
ABSOLUTE LYMPHOCYTES: 2596
ABSOLUTE MONOCYTES: 627
ABSOLUTE NEUTROPHILS: 7579
ALBUMIN/GLOBULIN RATIO: 1.5
ALBUMIN: 4.1
ALKALINE PHOSPHATASE: 43
ALT: 16
AST: 15
BASOPHILS: 0.5
BILIRUBIN, TOTAL: 0.3
BUN/CREATININE RATIO: (no result)
CALCIUM: 9.8
CARBON DIOXIDE: 25
CHLORIDE: 101
CREATININE: 1.02
EGFR: 62
EOSINOPHILS: 1.3
FIB 4 INDEX: 0.55
FIB 4 INTERPRETATION: (no result)
GLOBULIN: 2.8
GLUCOSE: 133
HEMATOCRIT: 40.7
HEMOGLOBIN: 12.7
LYMPHOCYTES: 23.6
MCH: 27.1
MCHC: 31.2
MCV: 86.8
MONOCYTES: 5.7
MPV: 10.4
NEUTROPHILS: 68.9
PLATELET COUNT: 424
PLATELET COUNT: 424
POTASSIUM: 4.6
PROTEIN, TOTAL: 6.9
RDW: 14.9
RED BLOOD CELL COUNT: 4.69
SODIUM: 137
UREA NITROGEN (BUN): 21
WHITE BLOOD CELL COUNT: 11

## 2024-08-03 NOTE — HPI-TODAY'S VISIT:
Dear Mariia Henson, August 2 labs show fib 4 index low at 0.55 and since this is less than 1.3 means you have a low risk to have advanced liver disease to be present and is another good measure to follow. Your sugar was a little up that day at 133 and unclear if you were fasting?  BUN 21 creatinine 1.02 sodium 137 potassium 4.6 albumin 4.1 bilirubin 0.3 alk phos 43 AST 15 and ALT 16 with ideal ALT less than 25 so that was good to see.  The platelet count was a little bit up a 424 and sometimes you can see that happen if you have been recently ill.  That is new for you as your platelet count was previously normal on the last 2 lab draws. WBC was also a little bit up at 11 which would also suggest that you were recently ill.  Hemoglobin was normal at 12.7.  MCV normal at 86.8. Neutrophils were 7579 and lymphocytes 2596 both normal. Look forward to seeing you at your next visit soon. Please share labs with local providers. Dr. Stevens

## 2024-08-05 ENCOUNTER — LAB OUTSIDE AN ENCOUNTER (OUTPATIENT)
Dept: URBAN - METROPOLITAN AREA TELEHEALTH 2 | Facility: TELEHEALTH | Age: 64
End: 2024-08-05

## 2024-08-12 ENCOUNTER — LAB OUTSIDE AN ENCOUNTER (OUTPATIENT)
Dept: URBAN - METROPOLITAN AREA TELEHEALTH 2 | Facility: TELEHEALTH | Age: 64
End: 2024-08-12

## 2024-08-14 ENCOUNTER — OFFICE VISIT (OUTPATIENT)
Dept: URBAN - METROPOLITAN AREA CLINIC 86 | Facility: CLINIC | Age: 64
End: 2024-08-14

## 2024-08-19 ENCOUNTER — LAB OUTSIDE AN ENCOUNTER (OUTPATIENT)
Dept: URBAN - METROPOLITAN AREA TELEHEALTH 2 | Facility: TELEHEALTH | Age: 64
End: 2024-08-19

## 2024-08-19 ENCOUNTER — OFFICE VISIT (OUTPATIENT)
Dept: URBAN - METROPOLITAN AREA TELEHEALTH 2 | Facility: TELEHEALTH | Age: 64
End: 2024-08-19
Payer: COMMERCIAL

## 2024-08-19 ENCOUNTER — DASHBOARD ENCOUNTERS (OUTPATIENT)
Age: 64
End: 2024-08-19

## 2024-08-19 VITALS — BODY MASS INDEX: 36.32 KG/M2 | HEIGHT: 63 IN | WEIGHT: 205 LBS

## 2024-08-19 DIAGNOSIS — E66.01 OBESITY: ICD-10-CM

## 2024-08-19 DIAGNOSIS — K75.81 NASH (NONALCOHOLIC STEATOHEPATITIS): ICD-10-CM

## 2024-08-19 DIAGNOSIS — R16.0 HEPATOMEGALY: ICD-10-CM

## 2024-08-19 PROCEDURE — 99214 OFFICE O/P EST MOD 30 MIN: CPT

## 2024-08-19 RX ORDER — NARATRIPTAN 2.5 MG/1
1 TABLET TABLET ORAL ONCE A DAY
Status: ACTIVE | COMMUNITY

## 2024-08-19 RX ORDER — TIZANIDINE 4 MG/1
1 TABLET AS NEEDED TABLET ORAL QHS
Status: ACTIVE | COMMUNITY

## 2024-08-19 RX ORDER — GLUCOSAMINE/D3/BOSWELLIA SERRA 1500MG-400
1 TABLET TABLET ORAL ONCE A DAY
Status: ACTIVE | COMMUNITY

## 2024-08-19 RX ORDER — IRBESARTAN 150 MG/1
1 TABLET TABLET, FILM COATED ORAL ONCE A DAY
Status: ACTIVE | COMMUNITY

## 2024-08-19 RX ORDER — AMLODIPINE BESYLATE 5 MG/1
1 TABLET TABLET ORAL ONCE A DAY
Status: ACTIVE | COMMUNITY

## 2024-08-19 RX ORDER — EVENING PRIMROSE OIL 500 MG
AS DIRECTED CAPSULE ORAL
Status: ACTIVE | COMMUNITY

## 2024-08-19 RX ORDER — METFORMIN HYDROCHLORIDE 1000 MG/1
TAKE 1 TABLET (1,000 MG) BY ORAL ROUTE 2 TIMES PER DAY WITH MORNING AND EVENING MEALS TABLET, COATED ORAL 2
Qty: 0 | Refills: 0 | Status: ACTIVE | COMMUNITY
Start: 1900-01-01

## 2024-08-19 RX ORDER — HYDROCODONE BITARTRATE AND ACETAMINOPHEN 5; 325 MG/1; MG/1
1 TABLET AS NEEDED TABLET ORAL
Status: ACTIVE | COMMUNITY

## 2024-08-19 RX ORDER — PROPRANOLOL HYDROCHLORIDE 20 MG/1
1 TABLET TABLET ORAL TWICE A DAY
Status: ON HOLD | COMMUNITY

## 2024-08-19 RX ORDER — LEVOTHYROXINE SODIUM 0.14 MG/1
TAKE 1 TABLET (137 MCG) BY ORAL ROUTE ONCE DAILY TABLET ORAL 1
Qty: 0 | Refills: 0 | Status: ACTIVE | COMMUNITY
Start: 1900-01-01

## 2024-08-19 NOTE — HPI-TODAY'S VISIT:
The patient is a 64 year old /White female seen March 2024 and sent in referral from Moo Gonzalez MD for [facility].    A copy of the note will be sent to the referring.  August 2 labs show fib 4 index low at 0.55 and since this is less than 1.3 means you have a low risk to have advanced liver disease to be present and is another good measure to follow. Your sugar was a little up that day at 133 and unclear if you were fasting? BUN 21 creatinine 1.02 sodium 137 potassium 4.6 albumin 4.1 bilirubin 0.3 alk phos 43 AST 15 and ALT 16 with ideal ALT less than 25 so that was good to see. The platelet count was a little bit up a 424 and sometimes you can see that happen if you have been recently ill. That is new for you as your platelet count was previously normal on the last 2 lab draws. She says she had a UTI. WBC was also a little bit up at 11 which would also suggest that you were recently ill. Hemoglobin was normal at 12.7. MCV normal at 86.8. Neutrophils were 7579 and lymphocytes 2596 both normal.  Asked and was to do an mri and she had issues and [facility] could not do and was to do mri.  She says was able to [place].  Only sedated mri in [facility].  [facility] then called and able to do mri sedated and needed .  Carla called and need to figure out to do.  [facility] was able to get it through.  Local [facility] labs from [facility] sent in after Carla called from February 6 2024. They had not sent it previously as they listed the referring physician is unavailable, physician Ren.This is not uncommon to see as hospitals in which I do not have active priviledges may list the referring provider as unavailable or unknown. The liver was enlarged measuring 20.6 cm and showed diffuse increased echogenicity compatible with fatty infiltration but no evidence of intrahepatic bile duct dilation.The pancreas was not seen due to bowel gas.Evaluation the gallbladder showed no evidence of any stones. Gallbladder wall thickness was normal at 3 mm. Common bile duct measured 6 mm.Abdominal aorta views showed no evidence of dilation or plaque. IVC was also within normal limits.Right kidney 9.3 cm with a 1.3 cm renal size given. No hydronephrosis. Left kidney was 8.1 cm with 1.3 cm renal cortex size given. No cysts were seen. On this kidney though as well as on the other kidney they did mention a mild perinephric fluid collection and would recommend that you review that with your primary provider.Spleen was normal in 9.6 cm.In summary, they mentioned that you had hepatomegaly with some fatty infiltration. They mentioned no stones and no hydronephrosis but you did have mild perinephric fluid.The did not give any guidance regarding this and so we important for you to review with your primary providers there and consider seeing a renal specialist.  March 2023 scan that was sent,  the liver appeared fatty but they did not mention that the liver is enlarged. They did not mention any issues with the kidneys.We do need going forward to assume that all scans done there will NOT be spontaneously be sent to me and to make sure that we have a system in place to notify us about a day or 2 after you do this so that Carla can call and get the report. Even though they are part of the [facility] system they have not integrated themselves into the epic network in which I am in, as otherwise I would have showed up as a physician in their [facility] system  Add: u.s no acute findings aug 18 2023: pancreas heterogeneous in echoteture and pancreas not well seen. Liver 20.5cm and appeared steatosis. No gallstones. CBD 5mm. Tight kidney 10.5cm and 8.8  left kidney. Liver 20.5cm and Spleen 9.1 cm.  March 13 labs show glucose was elevated at 131 but down from 213 back in September. Please share with primary provider. BUN of 19 which is normal but creatinine slightly up again at 1.04 from 1.02 last time. Sodium 141 potassium 4.9 calcium 9.8 albumin 4.1 bilirubin 0.3 alk phos 51 AST 21 ALT of 17 and previously AST 20 and ALT 20. Ideal ALT less than 25 so doing well there. WBC 8.6, hemoglobin 12 platelet 367 and these are normal. MCV was 88. Neutrophils are 5.4 and lymphocytes 2.2. Looking forward to seeing you at your upcoming visit and reviewing these labs and your course with you.  She had been exercising less due to oct 2023 rotator cuff and doing less exercise and mainly doing the physical exercise and to be doing more.  She may be doing rotator surgery.  Asked what a1c is doing 7,4 now in aug 2024 and prior 7.7% still. Could be due to surgery.  September 18 labs showed glucose elevated at 213 and up from 165 in October of last year. She says a1c 7.7%. She is on the pump and says that pump is regulating and that leads to swings. Please share with primary provider. BUN was 17 which is normal but creatinine slightly up at 1.02 from previous 0.99. Sodium 136 potassium 4.9 calcium 9.6 albumin 4.3 bilirubin 0.4 alk phos 44 with AST of 20 and ALT of 20 with ideal ALT less than 25.  Previously your AST had been 17 and ALT of 15. WBC 7.3, hemoglobin 12.5 platelet count normal at 355. Your MCH was slightly low at 31.1 but higher than 30.8 before. Neutrophils and lymphocytes normal. Please share with primary providers.  Did the imaging at [facility]:  u.s no acute findings aug 18 2023: pancreas heterogeneous in echoteture and pancreas not well seen. Liver 20.5cm and appeared steatosis. No gallstones. CBD 5mm. Tight kidney 10.5cm and 8.8 left kidney. Liver 20.5cm and Spleen 9.1 cm.   March 30 labs show hep B surface antibody with no immunity noted. Sugar was elevated at 126 and please share with primary provider.  Maybe not fasting that day?  BUN was 21 creatinine slightly up at 1.10.  Sodium 140 potassium 4.9 calcium 9.5 albumin 3.9 bilirubin 0.3 and alkaline phosphatase was low at 36.  Have you checked your vitamin D level recently primary provider?  May want to do so as sometimes a low vitamin D level can cause it. AST was normal at 20 and ALT was normal at 19. WBC was normal at 7.4 but hemoglobin a little low at 11.3.  Please share with primary provider.  Platelet count was 315 normal.  MCV normal 87.4.  Your MCH and MCHC were slightly low please share with primary provider.  Neutrophils and lymphocytes were normal.  March 2023 The pancreas not well see. Liver appears fatty. No lesions. The common duct 6mm, this is upper limits of normal in size. Right kidney 10.2. Hepatic Vasculature patent. Spleen 9.3 cm  Overall they saw fatty liver, be sure to work on the diet, exercise, weight loss to help with this.  7.7% and prior was a1c 7.5% recently.  October 10 2022  labs show sugar elevated at 165 but down from 202 back in January.  Please share with primary provider. BUN 19 creatinine 0.99 sodium 138 potassium 4.8 calcium 9.5 albumin 4.4 bilirubin 0.4 alkaline phosphatase 44 down from 60.  AST noted to be down to 17 from 29 and ALT down to 15 from 37 so these labs are much better. White Blood cell count 8 hemoglobin 11.9 platelet count 364.  Platelets fracture elevated last time in January at 451. MCV normal at 88.  The MCHC is still slightly low at 30.8 but up to 30.1.  Please share with primary provider. Neutrophils 4.5 and lymphocytes 2.5. Good to see that the labs are doing better.    She says [facility] bought  [facility] and when tried to do there did not get.   January 10 2022 labs. You read the essential labs to me at visit but this is the full set. White blood cell count was 9.4 hemoglobin 13.6 hematocrit 44.6 platelet count 418.  These are normal range.  Curiously your mean corpuscular hemoglobin was 26.2 which is a little low and your mean corpuscular hemoglobin concentration likewise was a little low at 30.5.  Please review with primary provider.  Neutrophils normal at 5.6 and lymphocytes 2.8. Glucose remains up at 202 and BUN of 24 creatinine 0.95 sodium 140 potassium 5.1 chloride 99 CO2 of 21 calcium elevated at 10.4.  You on calcium supplements?  That sometimes can raise that. Albumin 4.8 bilirubin 0.2 alk phos 60 AST 30 ALT elevated at 34.  Ideal ALT is less than 25. Cholesterol 123, triglycerides 100, HDL 52, LDL 52. Hemoglobin A1c up to 9.3 as you mention.  So that went up. TSH 1.77. I think again that the missing ingredient may be the exercise daily  activity such as the walking for 30 minutes.  Please discuss with your doctors and see if that ok to do and if so lets see if it helps labs.  She says a1c 8.08% ?  Dec visit 2021 pt had cough x 1 month.  She is on otc for this. She never learned as to cause and they have some cough remedies. She said she took a z-gualberto.   Jan 2022 she did labs: a1c 9.3%. Ast and alt done and they were 30 and alt 34   oct 2021: cbc ok, glucose elevated 151 follow up with primary MD. ast 30, alt 35 elevated. t4 1.93 elevated tsh low 0.188  She is to do an u.s locally for us. She will call to be sure that we get it.  10-9 2021 local u.s sent in to us:  Visualized portions of the IVC and abdominal aorta were clear to them. The pancreatic head and body were somewhat heterogeneous but without focal solid or cystic mass.  Distal pancreatic body and tail were obscured by gas. The liver was enlarged measuring 21.6 cm and appeared diffusely echogenic but without focal mass. Gallbladder was normal without stones.  Common bile duct was normal at 4 mm.  Portal vein had normal directional flow seen. Spleen was normal at 9.2 cm. Right kidney was 9.8 x 5.1 x 5.0 cm with no focal mass.  Left kidney 9.1 x 5.2 x 5.37cm with no focal mass. They mention that you have persistent hepatomegaly with diffuse steatosis or fat of the liver.  They mention again that your pancreas was mildly heterogeneous and that portions were not seen due to gas. As you recall your prior ultrasound had said the liver was mildly enlarged as well and also diffusely echogenic.  They said it was 22.3 cm last time and so it does appear to be possibly slightly smaller at this time but that can vary depending on how they measure it. We need to keep working on those issues that we talked about at the visit.  Hopefully with continued work this will improve as well. Did at [facility].  September 27 labs show white blood cell count 8.1 hemoglobin 12.4 platelet count 419.  Previously hemoglobin 12.6 and platelet count 370.  Your MCH remains low at 25.5 and MCHC remains low at 30.1.  Please share with primary provider.  Neutrophils 4.7 and lymphocytes 2.4 both normal. Sugar elevated at 179 previously was 149.  Please share with primary provider as well. BUN of 20 creatinine 0.94 sodium 138 potassium 4.9 albumin 4.3 bilirubin 0.3 alk phosphatase 49 AST 23 and the ALT 33.  Previously AST 16 and ALT 21 so slightly up on the AST and ALT.  Hx a prior liver biopsy on 02/11/2013 showed 10% micro and macrovesicular steatosis, no portal inflammation,no granulamata or malignant infiltrates, no fibrosis.   She states no history of  alcohol use.   Weight has been going up and gained up and been to several conferences.  Per the patient she had a colonoscopy in 2016 and she says that is next is due in 10 yrs.   March 22 2021: wbc 7.5 and hg 12.6 and plat 370 and mcv 85. Neutrophils 4.3. tp 6.6 and alb 4.3 and tb 0.3 and alk 52 and ast 16 and alt 21 and ideal alt les than 25. Glu 149 elevated and bun 20 and cr 0.99. March 2020 ast and alt  24 and  26 so lower now.  She says is working at exercise and doing 5k and now occ 7-9K.   even walking can help.  Aug 3 2020  local labs: po4 3.6 and Uric aid 4.2, ferritin 99, mg 1.8 and a1c 8.6 % elevated. iron sat 19%.  glucose 223 elevated and show local md, cr 0.97 and na 140 and k 5.1 and cl 102 ad co2 22 and ca 9.8 and alb 4.2 and tb 0.2 abd alk 49 and ast 22 and alt 25 and prior ast 28 and alt 33 so lower now. urine protein elevated 408 mg/24 hours ( normal so you have more protein in urine noted.)  Saw  also second labs aug 3 glu 217 and cr 0.99 and na 138 and k 5.2 and cl 100 and co2 22 and ca 9.8 and alb 4.4 and tb 0.2 and alk 48 and ast 18 and alt 23, wbc 8.1 hg 12.5 plat 414.  These were ordered by two diff doctors.  Sept 2020:  U.s sent in: mildly enlarged liver. Liver diffusely echogenic and fatty. Spleen normal 9.6cm. No kidney hydronephrosis. No gallstones and common duct 4.5mm. Liver mildly enlarged 22.3 cm.   Prior u.s stated liver moderately enlarged. They prior mentioned 6.4mm kidney stone left kidney but not mentioned now.  Liver 21 cm so measurement could  be a little off on ultrasound.  June 2020 ast 28 and alt 33 and alk 48 and tb 0.2. alb 4.1.  March 2020 labs: wbc 8.5 hg 11.6 plat 359 and glu 273 elevated and show local md.  MCH 26 (26.6-33) mchc 30 (31.5-35.7) and should mention to PRIMARY MD. cr 0.94 and bun 19, na 137 k 4.4 and alb 4.2 and tb  less than 0.2 and alk 49 and ast 24 and alt 46. inr 1.0 and hepatitis a immune.   Plan: 1. Pt will do mri here sedated here and was approved. 2. Mri here would give more prognostic info. 3. Need reorder the mri. 4. Pt will do telemed 3m for next steps. 5. It is for fatty liver but has to do and stage 2-3 fibrosis. 6. Pt will do diet and exercise and work on the sugars.   Duration of the visit was 30 minutes with 10 minutes of chart prep reviewing notes and labs and scan and then for 20  minutes for this healow  telemed  visit with time reviewing her recent records and labs and discussing her current status and future plans for the patient.

## 2024-10-15 ENCOUNTER — APPOINTMENT (RX ONLY)
Dept: URBAN - METROPOLITAN AREA CLINIC 166 | Facility: CLINIC | Age: 64
Setting detail: DERMATOLOGY
End: 2024-10-15

## 2024-10-15 DIAGNOSIS — D49.2 NEOPLASM OF UNSPECIFIED BEHAVIOR OF BONE, SOFT TISSUE, AND SKIN: ICD-10-CM

## 2024-10-15 DIAGNOSIS — L82.1 OTHER SEBORRHEIC KERATOSIS: ICD-10-CM

## 2024-10-15 PROCEDURE — 11102 TANGNTL BX SKIN SINGLE LES: CPT

## 2024-10-15 PROCEDURE — ? RECOMMENDATIONS

## 2024-10-15 PROCEDURE — 99202 OFFICE O/P NEW SF 15 MIN: CPT | Mod: 25

## 2024-10-15 PROCEDURE — ? BIOPSY BY SHAVE METHOD

## 2024-10-15 PROCEDURE — ? COUNSELING

## 2024-10-15 ASSESSMENT — LOCATION SIMPLE DESCRIPTION DERM
LOCATION SIMPLE: POSTERIOR NECK
LOCATION SIMPLE: LEFT SCALP
LOCATION SIMPLE: RIGHT SCALP
LOCATION SIMPLE: LEFT EAR

## 2024-10-15 ASSESSMENT — LOCATION DETAILED DESCRIPTION DERM
LOCATION DETAILED: LEFT CENTRAL FRONTAL SCALP
LOCATION DETAILED: LEFT ANTERIOR EARLOBE
LOCATION DETAILED: LEFT SUPERIOR LATERAL NECK
LOCATION DETAILED: RIGHT CENTRAL FRONTAL SCALP

## 2024-10-15 ASSESSMENT — LOCATION ZONE DERM
LOCATION ZONE: EAR
LOCATION ZONE: NECK
LOCATION ZONE: SCALP

## 2024-10-15 NOTE — PROCEDURE: BIOPSY BY SHAVE METHOD
Body Location Override (Optional - Billing Will Still Be Based On Selected Body Map Location If Applicable): A - left posterior auricular
Detail Level: Detailed
Depth Of Biopsy: dermis
Was A Bandage Applied: Yes
Size Of Lesion In Cm: 0
Biopsy Type: H and E
Biopsy Method: Personna blade
Anesthesia Type: 1% lidocaine with epinephrine
Anesthesia Volume In Cc: 0.5
Hemostasis: Aluminum Chloride
Wound Care: Petrolatum
Dressing: bandage
Destruction After The Procedure: No
Type Of Destruction Used: Curettage
Curettage Text: The wound bed was treated with curettage after the biopsy was performed.
Cryotherapy Text: The wound bed was treated with cryotherapy after the biopsy was performed.
Electrodesiccation Text: The wound bed was treated with electrodesiccation after the biopsy was performed.
Electrodesiccation And Curettage Text: The wound bed was treated with electrodesiccation and curettage after the biopsy was performed.
Silver Nitrate Text: The wound bed was treated with silver nitrate after the biopsy was performed.
Lab: [id number]
Consent: Written consent was obtained and risks were reviewed including but not limited to scarring, infection, bleeding, scabbing, incomplete removal, nerve damage and allergy to anesthesia.
Post-Care Instructions: I reviewed with the patient in detail post-care instructions. Keep area clean with soap and water. Apply vaseline and bandage until area is healed.
Notification Instructions: Patient will be notified of biopsy results. However, patient instructed to call the office if not contacted within 2 weeks.
Billing Type: Third-Party Bill
Information: Selecting Yes will display possible errors in your note based on the variables you have selected. This validation is only offered as a suggestion for you. PLEASE NOTE THAT THE VALIDATION TEXT WILL BE REMOVED WHEN YOU FINALIZE YOUR NOTE. IF YOU WANT TO FAX A PRELIMINARY NOTE YOU WILL NEED TO TOGGLE THIS TO 'NO' IF YOU DO NOT WANT IT IN YOUR FAXED NOTE.

## 2024-10-15 NOTE — PROCEDURE: MIPS QUALITY
Quality 226: Preventive Care And Screening: Tobacco Use: Screening And Cessation Intervention: Patient screened for tobacco use and is an ex/non-smoker
Name And Contact Information For Health Care Proxy: Freya Iyer [phone]
Detail Level: Detailed
Quality 47: Advance Care Plan: Advance Care Planning discussed and documented; advance care plan or surrogate decision maker documented in the medical record.

## 2024-10-15 NOTE — PROCEDURE: RECOMMENDATIONS
Recommendation Preamble: The following recommendations were made during the visit:
Recommendations (Free Text): SA serum from the ordinary
Detail Level: Detailed
Render Risk Assessment In Note?: no

## 2024-11-05 ENCOUNTER — TELEPHONE ENCOUNTER (OUTPATIENT)
Dept: URBAN - METROPOLITAN AREA CLINIC 91 | Facility: CLINIC | Age: 64
End: 2024-11-05

## 2024-11-05 ENCOUNTER — LAB OUTSIDE AN ENCOUNTER (OUTPATIENT)
Dept: URBAN - METROPOLITAN AREA CLINIC 91 | Facility: CLINIC | Age: 64
End: 2024-11-05

## 2024-11-11 ENCOUNTER — LAB OUTSIDE AN ENCOUNTER (OUTPATIENT)
Dept: URBAN - METROPOLITAN AREA TELEHEALTH 2 | Facility: TELEHEALTH | Age: 64
End: 2024-11-11

## 2024-11-15 ENCOUNTER — TELEPHONE ENCOUNTER (OUTPATIENT)
Dept: URBAN - METROPOLITAN AREA CLINIC 91 | Facility: CLINIC | Age: 64
End: 2024-11-15

## 2024-11-15 ENCOUNTER — LAB OUTSIDE AN ENCOUNTER (OUTPATIENT)
Dept: URBAN - METROPOLITAN AREA CLINIC 91 | Facility: CLINIC | Age: 64
End: 2024-11-15

## 2024-11-21 ENCOUNTER — TELEPHONE ENCOUNTER (OUTPATIENT)
Dept: URBAN - METROPOLITAN AREA CLINIC 86 | Facility: CLINIC | Age: 64
End: 2024-11-21

## 2024-11-21 LAB
A/G RATIO: 1.5
ABSOLUTE BASOPHILS: 36
ABSOLUTE EOSINOPHILS: 180
ABSOLUTE LYMPHOCYTES: 2655
ABSOLUTE MONOCYTES: 684
ABSOLUTE NEUTROPHILS: 5445
ALBUMIN: 4.1
ALKALINE PHOSPHATASE: 46
ALT (SGPT): 19
AST (SGOT): 19
BASOPHILS: 0.4
BILIRUBIN, TOTAL: 0.3
BUN/CREATININE RATIO: (no result)
BUN: 17
CALCIUM: 9.7
CARBON DIOXIDE, TOTAL: 28
CHLORIDE: 103
CREATININE: 1
EGFR: 63
EOSINOPHILS: 2
GLOBULIN, TOTAL: 2.7
GLUCOSE: 127
HEMATOCRIT: 38.9
HEMOGLOBIN: 12.3
LYMPHOCYTES: 29.5
MCH: 26.5
MCHC: 31.6
MCV: 83.8
MONOCYTES: 7.6
MPV: 10.6
NEUTROPHILS: 60.5
PLATELET COUNT: 363
POTASSIUM: 4.6
PROTEIN, TOTAL: 6.8
RDW: 13.9
RED BLOOD CELL COUNT: 4.64
SODIUM: 140
WHITE BLOOD CELL COUNT: 9

## 2024-11-21 NOTE — HPI-TODAY'S VISIT:
Dear Mariia HANDLEY Sixto,  November 20 labs showed WBC count 9 hemoglobin 12.3 hematocrit 38.9 MCV 83.8 and platelet count 363 and these were all normal but your MCH is a little low at 26.5 and was previously normal back in August at 27.1.  MCHC was a little low at 31.6 with normal being from 32 up to 36.  It was slightly lower at 31.2 back in August  so this current level is coming up some from before.  Please share with primary provider.  Neutrophils normal at 5445 and lymphocytes 2655.  Sugar was elevated 127 but unclear if you are fasting?  Back in March her labs showed a glucose of 131.  BUN was normal at 17 and creatinine normal 1.0 with a sodium 140 potassium 4.6 calcium 9.7 bilirubin 0.3 alk phos 46 AST of 19 and ALT of 19.  Good to see that the rest your labs are stable and would acid you share these slight variations as listed above with your primary provider.  Dr. Stevens

## 2024-11-27 ENCOUNTER — OFFICE VISIT (OUTPATIENT)
Dept: URBAN - METROPOLITAN AREA TELEHEALTH 2 | Facility: TELEHEALTH | Age: 64
End: 2024-11-27
Payer: COMMERCIAL

## 2024-11-27 ENCOUNTER — TELEPHONE ENCOUNTER (OUTPATIENT)
Dept: URBAN - METROPOLITAN AREA CLINIC 86 | Facility: CLINIC | Age: 64
End: 2024-11-27

## 2024-11-27 VITALS — HEIGHT: 63 IN | BODY MASS INDEX: 37.03 KG/M2 | WEIGHT: 209 LBS

## 2024-11-27 DIAGNOSIS — N28.9 ABNORMAL RENAL FUNCTION: ICD-10-CM

## 2024-11-27 DIAGNOSIS — R16.0 HEPATOMEGALY: ICD-10-CM

## 2024-11-27 DIAGNOSIS — R74.8 ABNORMAL LIVER ENZYMES: ICD-10-CM

## 2024-11-27 DIAGNOSIS — E66.812 CLASS 2 OBESITY: ICD-10-CM

## 2024-11-27 DIAGNOSIS — D35.2 PITUITARY ADENOMA: ICD-10-CM

## 2024-11-27 DIAGNOSIS — K75.81 NASH (NONALCOHOLIC STEATOHEPATITIS): ICD-10-CM

## 2024-11-27 DIAGNOSIS — Z98.890 HISTORY OF COLONOSCOPY: ICD-10-CM

## 2024-11-27 DIAGNOSIS — Z71.89 VACCINE COUNSELING: ICD-10-CM

## 2024-11-27 DIAGNOSIS — E83.52 HIGH CALCIUM LEVELS: ICD-10-CM

## 2024-11-27 DIAGNOSIS — E11.9 ABNORMAL METABOLIC STATE DUE TO DIABETES MELLITUS: ICD-10-CM

## 2024-11-27 DIAGNOSIS — R79.89 ELEVATED PLATELET COUNT: ICD-10-CM

## 2024-11-27 PROBLEM — 443381000124105: Status: ACTIVE | Noted: 2024-11-27

## 2024-11-27 PROCEDURE — 99214 OFFICE O/P EST MOD 30 MIN: CPT

## 2024-11-27 RX ORDER — GLUCOSAMINE/D3/BOSWELLIA SERRA 1500MG-400
1 TABLET TABLET ORAL ONCE A DAY
Status: ACTIVE | COMMUNITY

## 2024-11-27 RX ORDER — LEVOTHYROXINE SODIUM 0.14 MG/1
TAKE 1 TABLET (137 MCG) BY ORAL ROUTE ONCE DAILY TABLET ORAL 1
Qty: 0 | Refills: 0 | Status: ACTIVE | COMMUNITY
Start: 1900-01-01

## 2024-11-27 RX ORDER — METFORMIN HYDROCHLORIDE 1000 MG/1
TAKE 1 TABLET (1,000 MG) BY ORAL ROUTE 2 TIMES PER DAY WITH MORNING AND EVENING MEALS TABLET, COATED ORAL 2
Qty: 0 | Refills: 0 | Status: ACTIVE | COMMUNITY
Start: 1900-01-01

## 2024-11-27 RX ORDER — PROPRANOLOL HYDROCHLORIDE 20 MG/1
1 TABLET TABLET ORAL TWICE A DAY
Status: ON HOLD | COMMUNITY

## 2024-11-27 RX ORDER — HYDROCODONE BITARTRATE AND ACETAMINOPHEN 5; 325 MG/1; MG/1
1 TABLET AS NEEDED TABLET ORAL
Status: ACTIVE | COMMUNITY

## 2024-11-27 RX ORDER — TIZANIDINE 4 MG/1
1 TABLET AS NEEDED TABLET ORAL QHS
Status: ACTIVE | COMMUNITY

## 2024-11-27 RX ORDER — EVENING PRIMROSE OIL 500 MG
AS DIRECTED CAPSULE ORAL
Status: ACTIVE | COMMUNITY

## 2024-11-27 RX ORDER — IRBESARTAN 150 MG/1
1 TABLET TABLET, FILM COATED ORAL ONCE A DAY
Status: ACTIVE | COMMUNITY

## 2024-11-27 RX ORDER — DAPAGLIFLOZIN 5 MG/1
1 TABLET TABLET, FILM COATED ORAL ONCE A DAY
Status: DISCONTINUED | COMMUNITY

## 2024-11-27 RX ORDER — NARATRIPTAN 2.5 MG/1
1 TABLET TABLET ORAL ONCE A DAY
Status: ACTIVE | COMMUNITY

## 2024-11-27 RX ORDER — AMLODIPINE BESYLATE 5 MG/1
1 TABLET TABLET ORAL ONCE A DAY
Status: ACTIVE | COMMUNITY

## 2024-11-27 NOTE — EXAM-PHYSICAL EXAM
Telemed self reported:  Gen: no distress. Eyes: no jaundice. Mouth: no thrush. CV: no chest pain. Resp: no wheezes. Abd: no pain. Ext: no edema.	 Neuro: no weakness.
Normal

## 2024-11-27 NOTE — HPI-TODAY'S VISIT:
The patient is a 64 year old /White female seen Aug 2024 and sent in referral from Moo Gonzalez MD for [facility].    A copy of the note will be sent to the referring.  November 20 labs showed WBC count 9 hemoglobin 12.3 hematocrit 38.9 MCV 83.8 and platelet count 363 and these were all normal but your MCH is a little low at 26.5 and was previously normal back in August at 27.1.  MCHC was a little low at 31.6 with normal being from 32 up to 36.  It was slightly lower at 31.2 back in August  so this current level is coming up some from before. Please share with primary provider. Neutrophils normal at 5445 and lymphocytes 2655. Sugar was elevated 127 but unclear if you are fasting? She was fasting. Asked re hga1c and was 7.5%. Back in March her labs showed a glucose of 131. BUN was normal at 17 and creatinine normal 1.0 with a sodium 140 potassium 4.6 calcium 9.7 bilirubin 0.3 alk phos 46 AST of 19 and ALT of 19.  August 2 labs show fib 4 index low at 0.55 and since this is less than 1.3 means you have a low risk to have advanced liver disease to be present and is another good measure to follow. Your sugar was a little up that day at 133 and unclear if you were fasting? BUN 21 creatinine 1.02 sodium 137 potassium 4.6 albumin 4.1 bilirubin 0.3 alk phos 43 AST 15 and ALT 16 with ideal ALT less than 25 so that was good to see. The platelet count was a little bit up a 424 and sometimes you can see that happen if you have been recently ill. That is new for you as your platelet count was previously normal on the last 2 lab draws. She says she had a UTI. WBC was also a little bit up at 11 which would also suggest that you were recently ill. Hemoglobin was normal at 12.7. MCV normal at 86.8. Neutrophils were 7579 and lymphocytes 2596 both normal.  Had 2 utis and 2 yeast infection.  She did the u/s scan yesterday at [facility] for us,  [place] then called and able to do mri sedated and needed .   Local [facility] labs from [facility] sent in after Carla called from February 6 2024. They had not sent it previously as they listed the referring physician is unavailable, physician Ren.This is not uncommon to see as hospitals in which I do not have active priviledges may list the referring provider as unavailable or unknown. The liver was enlarged measuring 20.6 cm and showed diffuse increased echogenicity compatible with fatty infiltration but no evidence of intrahepatic bile duct dilation.The pancreas was not seen due to bowel gas.Evaluation the gallbladder showed no evidence of any stones. Gallbladder wall thickness was normal at 3 mm. Common bile duct measured 6 mm.Abdominal aorta views showed no evidence of dilation or plaque. IVC was also within normal limits.Right kidney 9.3 cm with a 1.3 cm renal size given. No hydronephrosis. Left kidney was 8.1 cm with 1.3 cm renal cortex size given. No cysts were seen. On this kidney though as well as on the other kidney they did mention a mild perinephric fluid collection and would recommend that you review that with your primary provider.Spleen was normal in 9.6 cm.In summary, they mentioned that you had hepatomegaly with some fatty infiltration. They mentioned no stones and no hydronephrosis but you did have mild perinephric fluid.The did not give any guidance regarding this and so we important for you to review with your primary providers there and consider seeing a renal specialist.  March 2023 scan that was sent,  the liver appeared fatty but they did not mention that the liver is enlarged. They did not mention any issues with the kidneys.We do need going forward to assume that all scans done there will NOT be spontaneously be sent to me and to make sure that we have a system in place to notify us about a day or 2 after you do this so that Carla can call and get the report. Even though they are part of the [facility] system they have not integrated themselves into the epic network in which I am in, as otherwise I would have showed up as a physician in their [place] system  Add: u.s no acute findings aug 18 2023: pancreas heterogeneous in echoteture and pancreas not well seen. Liver 20.5cm and appeared steatosis. No gallstones. CBD 5mm. Tight kidney 10.5cm and 8.8  left kidney. Liver 20.5cm and Spleen 9.1 cm.  March 13 labs show glucose was elevated at 131 but down from 213 back in September. Please share with primary provider. BUN of 19 which is normal but creatinine slightly up again at 1.04 from 1.02 last time. Sodium 141 potassium 4.9 calcium 9.8 albumin 4.1 bilirubin 0.3 alk phos 51 AST 21 ALT of 17 and previously AST 20 and ALT 20. Ideal ALT less than 25 so doing well there. WBC 8.6, hemoglobin 12 platelet 367 and these are normal. MCV was 88. Neutrophils are 5.4 and lymphocytes 2.2. Looking forward to seeing you at your upcoming visit and reviewing these labs and your course with you.  She had been exercising less due to oct 2023 rotator cuff and doing less exercise and mainly doing the physical exercise and to be doing more.  She may be doing rotator surgery.  Asked what a1c is doing 7,4 now in aug 2024 and prior 7.7% still. Could be due to surgery.  September 18 labs showed glucose elevated at 213 and up from 165 in October of last year. She says a1c 7.7%. She is on the pump and says that pump is regulating and that leads to swings. Please share with primary provider. BUN was 17 which is normal but creatinine slightly up at 1.02 from previous 0.99. Sodium 136 potassium 4.9 calcium 9.6 albumin 4.3 bilirubin 0.4 alk phos 44 with AST of 20 and ALT of 20 with ideal ALT less than 25.  Previously your AST had been 17 and ALT of 15. WBC 7.3, hemoglobin 12.5 platelet count normal at 355. Your MCH was slightly low at 31.1 but higher than 30.8 before. Neutrophils and lymphocytes normal. Please share with primary providers.  Did the imaging at [facility]:  u.s no acute findings aug 18 2023: pancreas heterogeneous in echoteture and pancreas not well seen. Liver 20.5cm and appeared steatosis. No gallstones. CBD 5mm. Tight kidney 10.5cm and 8.8 left kidney. Liver 20.5cm and Spleen 9.1 cm.   March 30 labs show hep B surface antibody with no immunity noted. Sugar was elevated at 126 and please share with primary provider.  Maybe not fasting that day?  BUN was 21 creatinine slightly up at 1.10.  Sodium 140 potassium 4.9 calcium 9.5 albumin 3.9 bilirubin 0.3 and alkaline phosphatase was low at 36.  Have you checked your vitamin D level recently primary provider?  May want to do so as sometimes a low vitamin D level can cause it. AST was normal at 20 and ALT was normal at 19. WBC was normal at 7.4 but hemoglobin a little low at 11.3.  Please share with primary provider.  Platelet count was 315 normal.  MCV normal 87.4.  Your MCH and MCHC were slightly low please share with primary provider.  Neutrophils and lymphocytes were normal.  March 2023 The pancreas not well see. Liver appears fatty. No lesions. The common duct 6mm, this is upper limits of normal in size. Right kidney 10.2. Hepatic Vasculature patent. Spleen 9.3 cm  Overall they saw fatty liver, be sure to work on the diet, exercise, weight loss to help with this.  7.7% and prior was a1c 7.5% recently.  October 10 2022  labs show sugar elevated at 165 but down from 202 back in January.  Please share with primary provider. BUN 19 creatinine 0.99 sodium 138 potassium 4.8 calcium 9.5 albumin 4.4 bilirubin 0.4 alkaline phosphatase 44 down from 60.  AST noted to be down to 17 from 29 and ALT down to 15 from 37 so these labs are much better. White Blood cell count 8 hemoglobin 11.9 platelet count 364.  Platelets fracture elevated last time in January at 451. MCV normal at 88.  The MCHC is still slightly low at 30.8 but up to 30.1.  Please share with primary provider. Neutrophils 4.5 and lymphocytes 2.5. Good to see that the labs are doing better.    She says [place] bought  [employer] and when tried to do there did not get.   January 10 2022 labs. You read the essential labs to me at visit but this is the full set. White blood cell count was 9.4 hemoglobin 13.6 hematocrit 44.6 platelet count 418.  These are normal range.  Curiously your mean corpuscular hemoglobin was 26.2 which is a little low and your mean corpuscular hemoglobin concentration likewise was a little low at 30.5.  Please review with primary provider.  Neutrophils normal at 5.6 and lymphocytes 2.8. Glucose remains up at 202 and BUN of 24 creatinine 0.95 sodium 140 potassium 5.1 chloride 99 CO2 of 21 calcium elevated at 10.4.  You on calcium supplements?  That sometimes can raise that. Albumin 4.8 bilirubin 0.2 alk phos 60 AST 30 ALT elevated at 34.  Ideal ALT is less than 25. Cholesterol 123, triglycerides 100, HDL 52, LDL 52. Hemoglobin A1c up to 9.3 as you mention.  So that went up. TSH 1.77. I think again that the missing ingredient may be the exercise daily  activity such as the walking for 30 minutes.  Please discuss with your doctors and see if that ok to do and if so lets see if it helps labs.  She says a1c 8.08% ?  Dec visit 2021 pt had cough x 1 month.  She is on otc for this. She never learned as to cause and they have some cough remedies. She said she took a z-gualberto.   Jan 2022 she did labs: a1c 9.3%. Ast and alt done and they were 30 and alt 34   oct 2021: cbc ok, glucose elevated 151 follow up with primary MD. ast 30, alt 35 elevated. t4 1.93 elevated tsh low 0.188  She is to do an u.s locally for us. She will call to be sure that we get it.  10-9 2021 local u.s sent in to us:  Visualized portions of the IVC and abdominal aorta were clear to them. The pancreatic head and body were somewhat heterogeneous but without focal solid or cystic mass.  Distal pancreatic body and tail were obscured by gas. The liver was enlarged measuring 21.6 cm and appeared diffusely echogenic but without focal mass. Gallbladder was normal without stones.  Common bile duct was normal at 4 mm.  Portal vein had normal directional flow seen. Spleen was normal at 9.2 cm. Right kidney was 9.8 x 5.1 x 5.0 cm with no focal mass.  Left kidney 9.1 x 5.2 x 5.37cm with no focal mass. They mention that you have persistent hepatomegaly with diffuse steatosis or fat of the liver.  They mention again that your pancreas was mildly heterogeneous and that portions were not seen due to gas. As you recall your prior ultrasound had said the liver was mildly enlarged as well and also diffusely echogenic.  They said it was 22.3 cm last time and so it does appear to be possibly slightly smaller at this time but that can vary depending on how they measure it. We need to keep working on those issues that we talked about at the visit.  Hopefully with continued work this will improve as well. Did at [facility].  September 27 labs show white blood cell count 8.1 hemoglobin 12.4 platelet count 419.  Previously hemoglobin 12.6 and platelet count 370.  Your MCH remains low at 25.5 and MCHC remains low at 30.1.  Please share with primary provider.  Neutrophils 4.7 and lymphocytes 2.4 both normal. Sugar elevated at 179 previously was 149.  Please share with primary provider as well. BUN of 20 creatinine 0.94 sodium 138 potassium 4.9 albumin 4.3 bilirubin 0.3 alk phosphatase 49 AST 23 and the ALT 33.  Previously AST 16 and ALT 21 so slightly up on the AST and ALT.  Hx a prior liver biopsy on 02/11/2013 showed 10% micro and macrovesicular steatosis, no portal inflammation,no granulamata or malignant infiltrates, no fibrosis.   She states no history of  alcohol use.   Weight has been going up and gained up and been to several conferences.  Per the patient she had a colonoscopy in 2016 and she says that is next is due in 10 yrs.   March 22 2021: wbc 7.5 and hg 12.6 and plat 370 and mcv 85. Neutrophils 4.3. tp 6.6 and alb 4.3 and tb 0.3 and alk 52 and ast 16 and alt 21 and ideal alt les than 25. Glu 149 elevated and bun 20 and cr 0.99. March 2020 ast and alt  24 and  26 so lower now.  She says is working at exercise and doing 5k and now occ 7-9K.   even walking can help.  Aug 3 2020  local labs: po4 3.6 and Uric aid 4.2, ferritin 99, mg 1.8 and a1c 8.6 % elevated. iron sat 19%.  glucose 223 elevated and show local md, cr 0.97 and na 140 and k 5.1 and cl 102 ad co2 22 and ca 9.8 and alb 4.2 and tb 0.2 abd alk 49 and ast 22 and alt 25 and prior ast 28 and alt 33 so lower now. urine protein elevated 408 mg/24 hours ( normal so you have more protein in urine noted.)  Saw  also second labs aug 3 glu 217 and cr 0.99 and na 138 and k 5.2 and cl 100 and co2 22 and ca 9.8 and alb 4.4 and tb 0.2 and alk 48 and ast 18 and alt 23, wbc 8.1 hg 12.5 plat 414.  These were ordered by two diff doctors.  Sept 2020:  U.s sent in: mildly enlarged liver. Liver diffusely echogenic and fatty. Spleen normal 9.6cm. No kidney hydronephrosis. No gallstones and common duct 4.5mm. Liver mildly enlarged 22.3 cm.   Prior u.s stated liver moderately enlarged. They prior mentioned 6.4mm kidney stone left kidney but not mentioned now.  Liver 21 cm so measurement could  be a little off on ultrasound.  June 2020 ast 28 and alt 33 and alk 48 and tb 0.2. alb 4.1.  March 2020 labs: wbc 8.5 hg 11.6 plat 359 and glu 273 elevated and show local md.  MCH 26 (26.6-33) mchc 30 (31.5-35.7) and should mention to PRIMARY MD. cr 0.94 and bun 19, na 137 k 4.4 and alb 4.2 and tb  less than 0.2 and alk 49 and ast 24 and alt 46. inr 1.0 and hepatitis a immune.   Plan: 1. Pt did the u.s at [facility] and that is [facility] [place]. 2. She is very claustrophobic. 3. Pt labs stable. 4. COntinued healthy habits and exercise as can. 5. Plan for the u.s in 6m and did [place]. 6. Labs then.   Duration of the visit was 35 minutes with 10 minutes of chart prep reviewing notes and labs and scan and then for 25  [facility]  [facility] with time reviewing her recent records and labs and discussing her current status and future plans for the patient.

## 2024-11-27 NOTE — HPI-TODAY'S VISIT:
Dear Mariia Henson,   Nov 26 2024: u.s: As we discussed, here is the u.s report that we could not initially print. It was faxed in by Rosalee also later.   Liver normal in size and contour and echogenicity. No liver mass seen. Main portal vein patent with normal directional flow. Gallbladder not dilated and measured 5mm. Pancreas visualized portions unremarkable. Right kidney normal size 9.6 cm and no hydronephrosis. Left kidney 8.3cm and no hydronephrosis and no hydronephrosis. Spleen not enlarged 9.5cm.  Overall, they felt it was an unremarkable ultrasound, Dr Stevens

## 2025-05-05 ENCOUNTER — LAB OUTSIDE AN ENCOUNTER (OUTPATIENT)
Dept: URBAN - METROPOLITAN AREA TELEHEALTH 2 | Facility: TELEHEALTH | Age: 65
End: 2025-05-05

## 2025-05-31 ENCOUNTER — TELEPHONE ENCOUNTER (OUTPATIENT)
Dept: URBAN - METROPOLITAN AREA CLINIC 86 | Facility: CLINIC | Age: 65
End: 2025-05-31

## 2025-05-31 NOTE — HPI-TODAY'S VISIT:
Dear Mariia Henson,   [facility] locally has your name listed as Jennifer Henson and so that led to this taking a little longer to get to us.    May 28 ultrasound there showed aorta was normal and IVC patent as expected.   Liver was somewhat enlarged at 19.4 cm and appeared to be fatty.  Portal vein was patent/open as expected with normal directional flow.  Visualized pancreas normal.   Gallbladder/biliary tree showed no gallbladder wall thickening and no stones.  Trevizo sign negative.  Common bile duct 6.3 mm.   Right kidney 10.7 cm and left kidney 8.8 cm with a 4.7 mm echogenic focus seen which could be a nonobstructive kidney stone.  Please share with local providers to be aware of.   Spleen normal measuring up to 10.2 cm with splenic vessels patent.   No ascites seen.   Bladder volume was 41 mL at the time of the test.  Bladder wall measured 2.6 mm.   Will discuss more at your visit this next week.   Need to consider doing a fibroscan to assess fat and fibrosis of the liver which is now more available and better covered. That tells us more re the fat content and any fibrotic content.       Dr. Stevens

## 2025-06-02 ENCOUNTER — OFFICE VISIT (OUTPATIENT)
Dept: URBAN - METROPOLITAN AREA TELEHEALTH 2 | Facility: TELEHEALTH | Age: 65
End: 2025-06-02

## 2025-06-04 ENCOUNTER — TELEPHONE ENCOUNTER (OUTPATIENT)
Dept: URBAN - METROPOLITAN AREA CLINIC 86 | Facility: CLINIC | Age: 65
End: 2025-06-04

## 2025-06-04 NOTE — HPI-TODAY'S VISIT:
Dear Mariia Henson,    Silvia 3 labs show glucose elevated at 131 and previously 127.  Please share with local providers to look at your overall sugar trends.   BUN of 16 creatinine 0.86 and those are lower than in November and the BUN was 17 and creatinine 1.0 and good to see that.   Sodium 137 potassium 4.5 calcium 9.4 albumin 4.0 bilirubin 0.3 alk phos 43 and AST low at 17 and ALT low at 14 and good to see.  Ideal ALT less than 25.   WBC normal 8.3 hemoglobin 7.9 platelet count 332 MCV 87.2 with neutrophils 4997 and lymphocytes 2528.   Very pleased to see these labs below are.    Dr. Stevens

## 2025-06-05 ENCOUNTER — OFFICE VISIT (OUTPATIENT)
Dept: URBAN - METROPOLITAN AREA TELEHEALTH 2 | Facility: TELEHEALTH | Age: 65
End: 2025-06-05
Payer: COMMERCIAL

## 2025-06-05 ENCOUNTER — LAB OUTSIDE AN ENCOUNTER (OUTPATIENT)
Dept: URBAN - METROPOLITAN AREA TELEHEALTH 2 | Facility: TELEHEALTH | Age: 65
End: 2025-06-05

## 2025-06-05 DIAGNOSIS — R16.0 HEPATOMEGALY: ICD-10-CM

## 2025-06-05 DIAGNOSIS — Z98.890 HISTORY OF COLONOSCOPY: ICD-10-CM

## 2025-06-05 DIAGNOSIS — D35.2 PITUITARY ADENOMA: ICD-10-CM

## 2025-06-05 DIAGNOSIS — N28.9 ABNORMAL RENAL FUNCTION: ICD-10-CM

## 2025-06-05 DIAGNOSIS — R74.8 ABNORMAL LIVER ENZYMES: ICD-10-CM

## 2025-06-05 DIAGNOSIS — K75.81 NASH (NONALCOHOLIC STEATOHEPATITIS): ICD-10-CM

## 2025-06-05 DIAGNOSIS — E83.52 HIGH CALCIUM LEVELS: ICD-10-CM

## 2025-06-05 DIAGNOSIS — Z71.89 VACCINE COUNSELING: ICD-10-CM

## 2025-06-05 DIAGNOSIS — E66.811 CLASS 1 OBESITY: ICD-10-CM

## 2025-06-05 DIAGNOSIS — R79.89 ELEVATED PLATELET COUNT: ICD-10-CM

## 2025-06-05 PROCEDURE — 99214 OFFICE O/P EST MOD 30 MIN: CPT

## 2025-06-05 RX ORDER — ESCITALOPRAM OXALATE 10 MG/1
1 TABLET TABLET ORAL ONCE A DAY
Status: ACTIVE | COMMUNITY

## 2025-06-05 RX ORDER — IRBESARTAN 150 MG/1
1 TABLET TABLET ORAL ONCE A DAY
Status: ACTIVE | COMMUNITY

## 2025-06-05 RX ORDER — TIZANIDINE 4 MG/1
1 TABLET AS NEEDED TABLET ORAL QHS
Status: ACTIVE | COMMUNITY

## 2025-06-05 RX ORDER — HYDROCODONE BITARTRATE AND ACETAMINOPHEN 5; 325 MG/1; MG/1
1 TABLET AS NEEDED TABLET ORAL
Status: ACTIVE | COMMUNITY

## 2025-06-05 RX ORDER — EVENING PRIMROSE OIL 500 MG
AS DIRECTED CAPSULE ORAL
Status: ACTIVE | COMMUNITY

## 2025-06-05 RX ORDER — AMLODIPINE BESYLATE 5 MG/1
1 TABLET TABLET ORAL ONCE A DAY
Status: ACTIVE | COMMUNITY

## 2025-06-05 RX ORDER — PROPRANOLOL HYDROCHLORIDE 20 MG/1
1 TABLET TABLET ORAL TWICE A DAY
COMMUNITY

## 2025-06-05 RX ORDER — NARATRIPTAN 2.5 MG/1
1 TABLET TABLET ORAL ONCE A DAY
Status: ACTIVE | COMMUNITY

## 2025-06-05 RX ORDER — TIRZEPATIDE 5 MG/.5ML
0.5 ML INJECTION, SOLUTION SUBCUTANEOUS
Status: ACTIVE | COMMUNITY

## 2025-06-05 RX ORDER — METFORMIN HYDROCHLORIDE 1000 MG/1
TAKE 1 TABLET (1,000 MG) BY ORAL ROUTE 2 TIMES PER DAY WITH MORNING AND EVENING MEALS TABLET, COATED ORAL 2
Qty: 0 | Refills: 0 | Status: ACTIVE | COMMUNITY
Start: 1900-01-01

## 2025-06-05 RX ORDER — GLUCOSAMINE/D3/BOSWELLIA SERRA 1500MG-400
1 TABLET TABLET ORAL ONCE A DAY
Status: ACTIVE | COMMUNITY

## 2025-06-05 RX ORDER — LEVOTHYROXINE SODIUM 0.14 MG/1
TAKE 1 TABLET (137 MCG) BY ORAL ROUTE ONCE DAILY TABLET ORAL 1
Qty: 0 | Refills: 0 | Status: ACTIVE | COMMUNITY
Start: 1900-01-01

## 2025-06-05 NOTE — HPI-TODAY'S VISIT:
The patient is a 64 year old /White female seen Nov 2024 and sent in referral from Moo Gonzalez MD for BELTRAN.    A copy of the note will be sent to the referring.  Silvia 3 labs show glucose elevated at 131 and previously 127.  Please share with local providers to look at your overall sugar trends. Her a1c is 7.4% little up rom 7.1. Zepbound 5mg. BUN of 16 creatinine 0.86 and those are lower than in November and the BUN was 17 and creatinine 1.0 and good to see that. Sodium 137 potassium 4.5 calcium 9.4 albumin 4.0 bilirubin 0.3 alk phos 43 and AST low at 17 and ALT low at 14 and good to see.  Ideal ALT less than 25. WBC normal 8.3 hemoglobin 7.9 platelet count 332 MCV 87.2 with neutrophils 4997 and lymphocytes 2528. Very pleased to see these labs below are. Fib 0.86 and low risk for advanced fibrosis.   [place] locally has your name listed as Jennifer Henson and so that led to this taking a little longer to get to us.  May 28 ultrasound there showed aorta was normal and IVC patent as expected. Liver was somewhat enlarged at 19.4 cm and appeared to be fatty.  Portal vein was patent/open as expected with normal directional flow.  Visualized pancreas normal. Gallbladder/biliary tree showed no gallbladder wall thickening and no stones.  Trevizo sign negative.  Common bile duct 6.3 mm. Right kidney 10.7 cm and left kidney 8.8 cm with a 4.7 mm echogenic focus seen which could be a nonobstructive kidney stone.  Please share with local providers to be aware of. Spleen normal measuring up to 10.2 cm with splenic vessels patent. No ascites seen. Bladder volume was 41 mL at the time of the test.  Bladder wall measured 2.6 mm. Will discuss more at your visit this next week.  Need to consider doing a fibroscan to assess fat and fibrosis of the liver which is now more available and better covered. That tells us more re the fat content and any fibrotic content.   Stage 2-3 then rezdiffra would be option. GLP1 ozempic and mounjaro also to be approved for beltran alone even without dm.  Nov 26 2024: u.s: As we discussed, here is the u.s report that we could not initially print. It was faxed in by [facility] also later. Liver normal in size and contour and echogenicity. No liver mass seen. Main portal vein patent with normal directional flow. Gallbladder not dilated and measured 5mm. Pancreas visualized portions unremarkable. Right kidney normal size 9.6 cm and no hydronephrosis. Left kidney 8.3cm and no hydronephrosis and no hydronephrosis. Spleen not enlarged 9.5cm. Overall, they felt it was an unremarkable ultrasound,     November 20 labs showed WBC count 9 hemoglobin 12.3 hematocrit 38.9 MCV 83.8 and platelet count 363 and these were all normal but your MCH is a little low at 26.5 and was previously normal back in August at 27.1.  MCHC was a little low at 31.6 with normal being from 32 up to 36.  It was slightly lower at 31.2 back in August  so this current level is coming up some from before. Please share with primary provider. Neutrophils normal at 5445 and lymphocytes 2655. Sugar was elevated 127 but unclear if you are fasting? She was fasting. Asked re hga1c and was 7.5%. Back in March her labs showed a glucose of 131. BUN was normal at 17 and creatinine normal 1.0 with a sodium 140 potassium 4.6 calcium 9.7 bilirubin 0.3 alk phos 46 AST of 19 and ALT of 19.  August 2 labs show fib 4 index low at 0.55 and since this is less than 1.3 means you have a low risk to have advanced liver disease to be present and is another good measure to follow. Your sugar was a little up that day at 133 and unclear if you were fasting? BUN 21 creatinine 1.02 sodium 137 potassium 4.6 albumin 4.1 bilirubin 0.3 alk phos 43 AST 15 and ALT 16 with ideal ALT less than 25 so that was good to see. The platelet count was a little bit up a 424 and sometimes you can see that happen if you have been recently ill. That is new for you as your platelet count was previously normal on the last 2 lab draws. She says she had a UTI. WBC was also a little bit up at 11 which would also suggest that you were recently ill. Hemoglobin was normal at 12.7. MCV normal at 86.8. Neutrophils were 7579 and lymphocytes 2596 both normal.  Had 2 utis and 2 yeast infection.  She did the u/s scan yesterday at [facility] for us,  [facility] then called and able to do mri sedated and needed .   Local [facility] labs from [place] sent in after Carla called from February 6 2024. They had not sent it previously as they listed the referring physician is unavailable, physician Ren.This is not uncommon to see as hospitals in which I do not have active priviledges may list the referring provider as unavailable or unknown. The liver was enlarged measuring 20.6 cm and showed diffuse increased echogenicity compatible with fatty infiltration but no evidence of intrahepatic bile duct dilation.The pancreas was not seen due to bowel gas.Evaluation the gallbladder showed no evidence of any stones. Gallbladder wall thickness was normal at 3 mm. Common bile duct measured 6 mm.Abdominal aorta views showed no evidence of dilation or plaque. IVC was also within normal limits.Right kidney 9.3 cm with a 1.3 cm renal size given. No hydronephrosis. Left kidney was 8.1 cm with 1.3 cm renal cortex size given. No cysts were seen. On this kidney though as well as on the other kidney they did mention a mild perinephric fluid collection and would recommend that you review that with your primary provider.Spleen was normal in 9.6 cm.In summary, they mentioned that you had hepatomegaly with some fatty infiltration. They mentioned no stones and no hydronephrosis but you did have mild perinephric fluid.The did not give any guidance regarding this and so we important for you to review with your primary providers there and consider seeing a renal specialist.  March 2023 scan that was sent,  the liver appeared fatty but they did not mention that the liver is enlarged. They did not mention any issues with the kidneys.We do need going forward to assume that all scans done there will NOT be spontaneously be sent to me and to make sure that we have a system in place to notify us about a day or 2 after you do this so that Carla can call and get the report. Even though they are part of the [facility] system they have not integrated themselves into the epic network in which I am in, as otherwise I would have showed up as a physician in their [facility] system  Add: u.s no acute findings aug 18 2023: pancreas heterogeneous in echoteture and pancreas not well seen. Liver 20.5cm and appeared steatosis. No gallstones. CBD 5mm. Tight kidney 10.5cm and 8.8  left kidney. Liver 20.5cm and Spleen 9.1 cm.  March 13 labs show glucose was elevated at 131 but down from 213 back in September. Please share with primary provider. BUN of 19 which is normal but creatinine slightly up again at 1.04 from 1.02 last time. Sodium 141 potassium 4.9 calcium 9.8 albumin 4.1 bilirubin 0.3 alk phos 51 AST 21 ALT of 17 and previously AST 20 and ALT 20. Ideal ALT less than 25 so doing well there. WBC 8.6, hemoglobin 12 platelet 367 and these are normal. MCV was 88. Neutrophils are 5.4 and lymphocytes 2.2. Looking forward to seeing you at your upcoming visit and reviewing these labs and your course with you.  She had been exercising less due to oct 2023 rotator cuff and doing less exercise and mainly doing the physical exercise and to be doing more.  She may be doing rotator surgery.  Asked what a1c is doing 7,4 now in aug 2024 and prior 7.7% still. Could be due to surgery.  September 18 labs showed glucose elevated at 213 and up from 165 in October of last year. She says a1c 7.7%. She is on the pump and says that pump is regulating and that leads to swings. Please share with primary provider. BUN was 17 which is normal but creatinine slightly up at 1.02 from previous 0.99. Sodium 136 potassium 4.9 calcium 9.6 albumin 4.3 bilirubin 0.4 alk phos 44 with AST of 20 and ALT of 20 with ideal ALT less than 25.  Previously your AST had been 17 and ALT of 15. WBC 7.3, hemoglobin 12.5 platelet count normal at 355. Your MCH was slightly low at 31.1 but higher than 30.8 before. Neutrophils and lymphocytes normal. Please share with primary providers.  Did [facility]:  u.s no acute findings aug 18 2023: pancreas heterogeneous in echoteture and pancreas not well seen. Liver 20.5cm and appeared steatosis. No gallstones. CBD 5mm. Tight kidney 10.5cm and 8.8 left kidney. Liver 20.5cm and Spleen 9.1 cm.   March 30 labs show hep B surface antibody with no immunity noted. Sugar was elevated at 126 and please share with primary provider.  Maybe not fasting that day?  BUN was 21 creatinine slightly up at 1.10.  Sodium 140 potassium 4.9 calcium 9.5 albumin 3.9 bilirubin 0.3 and alkaline phosphatase was low at 36.  Have you checked your vitamin D level recently primary provider?  May want to do so as sometimes a low vitamin D level can cause it. AST was normal at 20 and ALT was normal at 19. WBC was normal at 7.4 but hemoglobin a little low at 11.3.  Please share with primary provider.  Platelet count was 315 normal.  MCV normal 87.4.  Your MCH and MCHC were slightly low please share with primary provider.  Neutrophils and lymphocytes were normal.  March 2023 The pancreas not well see. Liver appears fatty. No lesions. The common duct 6mm, this is upper limits of normal in size. Right kidney 10.2. Hepatic Vasculature patent. Spleen 9.3 cm  Overall they saw fatty liver, be sure to work on the diet, exercise, weight loss to help with this.  7.7% and prior was a1c 7.5% recently.  October 10 2022  labs show sugar elevated at 165 but down from 202 back in January.  Please share with primary provider. BUN 19 creatinine 0.99 sodium 138 potassium 4.8 calcium 9.5 albumin 4.4 bilirubin 0.4 alkaline phosphatase 44 down from 60.  AST noted to be down to 17 from 29 and ALT down to 15 from 37 so these labs are much better. White Blood cell count 8 hemoglobin 11.9 platelet count 364.  Platelets fracture elevated last time in January at 451. MCV normal at 88.  The MCHC is still slightly low at 30.8 but up to 30.1.  Please share with primary provider. Neutrophils 4.5 and lymphocytes 2.5. Good to see that the labs are doing better.    She says [facility] bought  [employer] and when tried to do there did not get.   January 10 2022 labs. You read the essential labs to me at visit but this is the full set. White blood cell count was 9.4 hemoglobin 13.6 hematocrit 44.6 platelet count 418.  These are normal range.  Curiously your mean corpuscular hemoglobin was 26.2 which is a little low and your mean corpuscular hemoglobin concentration likewise was a little low at 30.5.  Please review with primary provider.  Neutrophils normal at 5.6 and lymphocytes 2.8. Glucose remains up at 202 and BUN of 24 creatinine 0.95 sodium 140 potassium 5.1 chloride 99 CO2 of 21 calcium elevated at 10.4.  You on calcium supplements?  That sometimes can raise that. Albumin 4.8 bilirubin 0.2 alk phos 60 AST 30 ALT elevated at 34.  Ideal ALT is less than 25. Cholesterol 123, triglycerides 100, HDL 52, LDL 52. Hemoglobin A1c up to 9.3 as you mention.  So that went up. TSH 1.77. I think again that the missing ingredient may be the exercise daily  activity such as the walking for 30 minutes.  Please discuss with your doctors and see if that ok to do and if so lets see if it helps labs.  She says a1c 8.08% ?  Dec visit 2021 pt had cough x 1 month.  She is on otc for this. She never learned as to cause and they have some cough remedies. She said she took a z-gualberto.   Jan 2022 she did labs: a1c 9.3%. Ast and alt done and they were 30 and alt 34   oct 2021: cbc ok, glucose elevated 151 follow up with primary MD. ast 30, alt 35 elevated. t4 1.93 elevated tsh low 0.188  She is to do an u.s locally for us. She will call to be sure that we get it.  10-9 2021 local u.s sent in to us:  Visualized portions of the IVC and abdominal aorta were clear to them. The pancreatic head and body were somewhat heterogeneous but without focal solid or cystic mass.  Distal pancreatic body and tail were obscured by gas. The liver was enlarged measuring 21.6 cm and appeared diffusely echogenic but without focal mass. Gallbladder was normal without stones.  Common bile duct was normal at 4 mm.  Portal vein had normal directional flow seen. Spleen was normal at 9.2 cm. Right kidney was 9.8 x 5.1 x 5.0 cm with no focal mass.  Left kidney 9.1 x 5.2 x 5.37cm with no focal mass. They mention that you have persistent hepatomegaly with diffuse steatosis or fat of the liver.  They mention again that your pancreas was mildly heterogeneous and that portions were not seen due to gas. As you recall your prior ultrasound had said the liver was mildly enlarged as well and also diffusely echogenic.  They said it was 22.3 cm last time and so it does appear to be possibly slightly smaller at this time but that can vary depending on how they measure it. We need to keep working on those issues that we talked about at the visit.  Hopefully with continued work this will improve as well. Did at [facility].  September 27 labs show white blood cell count 8.1 hemoglobin 12.4 platelet count 419.  Previously hemoglobin 12.6 and platelet count 370.  Your MCH remains low at 25.5 and MCHC remains low at 30.1.  Please share with primary provider.  Neutrophils 4.7 and lymphocytes 2.4 both normal. Sugar elevated at 179 previously was 149.  Please share with primary provider as well. BUN of 20 creatinine 0.94 sodium 138 potassium 4.9 albumin 4.3 bilirubin 0.3 alk phosphatase 49 AST 23 and the ALT 33.  Previously AST 16 and ALT 21 so slightly up on the AST and ALT.  Hx a prior liver biopsy on 02/11/2013 showed 10% micro and macrovesicular steatosis, no portal inflammation,no granulamata or malignant infiltrates, no fibrosis.   She states no history of  alcohol use.   Weight has been going up and gained up and been to several conferences.  Per the patient she had a colonoscopy in 2016 and she says that is next is due in 10 yrs.   March 22 2021: wbc 7.5 and hg 12.6 and plat 370 and mcv 85. Neutrophils 4.3. tp 6.6 and alb 4.3 and tb 0.3 and alk 52 and ast 16 and alt 21 and ideal alt les than 25. Glu 149 elevated and bun 20 and cr 0.99. March 2020 ast and alt  24 and  26 so lower now.  She says is working at exercise and doing 5k and now occ 7-9K.   even walking can help.  Aug 3 2020  local labs: po4 3.6 and Uric aid 4.2, ferritin 99, mg 1.8 and a1c 8.6 % elevated. iron sat 19%.  glucose 223 elevated and show local md, cr 0.97 and na 140 and k 5.1 and cl 102 ad co2 22 and ca 9.8 and alb 4.2 and tb 0.2 abd alk 49 and ast 22 and alt 25 and prior ast 28 and alt 33 so lower now. urine protein elevated 408 mg/24 hours ( normal so you have more protein in urine noted.)  Saw  also second labs aug 3 glu 217 and cr 0.99 and na 138 and k 5.2 and cl 100 and co2 22 and ca 9.8 and alb 4.4 and tb 0.2 and alk 48 and ast 18 and alt 23, wbc 8.1 hg 12.5 plat 414.  These were ordered by two diff doctors.  Sept 2020:  U.s sent in: mildly enlarged liver. Liver diffusely echogenic and fatty. Spleen normal 9.6cm. No kidney hydronephrosis. No gallstones and common duct 4.5mm. Liver mildly enlarged 22.3 cm.   Prior u.s stated liver moderately enlarged. They prior mentioned 6.4mm kidney stone left kidney but not mentioned now.  Liver 21 cm so measurement could  be a little off on ultrasound.  June 2020 ast 28 and alt 33 and alk 48 and tb 0.2. alb 4.1.  March 2020 labs: wbc 8.5 hg 11.6 plat 359 and glu 273 elevated and show local md.  MCH 26 (26.6-33) mchc 30 (31.5-35.7) and should mention to PRIMARY MD. cr 0.94 and bun 19, na 137 k 4.4 and alb 4.2 and tb  less than 0.2 and alk 49 and ast 24 and alt 46. inr 1.0 and hepatitis a immune.   Plan: 1. Pt can do the fibroscan at [facility]. 2. Pt is very claustrophobic. 3. Pt will do u.s in 6m at [facility]. 4. Pt will work on the beltran and glp1 are felt to be good for beltran and going to get aprpoved for the beltran. 2 yrx of tx to get a fibrossi reduction and beltran resolution.   Duration of the visit was 30 minutes with 10 minutes of chart prep reviewing notes and labs and scan and then for 20  minutes for this dox telemed  visit with time reviewing her recent records and labs and discussing her current status and future plans for the patient.